# Patient Record
Sex: FEMALE | Race: WHITE | NOT HISPANIC OR LATINO
[De-identification: names, ages, dates, MRNs, and addresses within clinical notes are randomized per-mention and may not be internally consistent; named-entity substitution may affect disease eponyms.]

---

## 2018-06-27 ENCOUNTER — RESULT REVIEW (OUTPATIENT)
Age: 23
End: 2018-06-27

## 2018-12-07 ENCOUNTER — TRANSCRIPTION ENCOUNTER (OUTPATIENT)
Age: 23
End: 2018-12-07

## 2019-06-06 ENCOUNTER — RESULT REVIEW (OUTPATIENT)
Age: 24
End: 2019-06-06

## 2020-04-25 ENCOUNTER — MESSAGE (OUTPATIENT)
Age: 25
End: 2020-04-25

## 2020-05-05 ENCOUNTER — APPOINTMENT (OUTPATIENT)
Dept: DISASTER EMERGENCY | Facility: HOSPITAL | Age: 25
End: 2020-05-05

## 2020-05-05 LAB
SARS-COV-2 IGG SERPL IA-ACNC: <0.1 INDEX
SARS-COV-2 IGG SERPL QL IA: NEGATIVE

## 2020-06-15 ENCOUNTER — RESULT REVIEW (OUTPATIENT)
Age: 25
End: 2020-06-15

## 2020-07-07 ENCOUNTER — TRANSCRIPTION ENCOUNTER (OUTPATIENT)
Age: 25
End: 2020-07-07

## 2020-11-24 ENCOUNTER — TRANSCRIPTION ENCOUNTER (OUTPATIENT)
Age: 25
End: 2020-11-24

## 2020-11-24 ENCOUNTER — APPOINTMENT (OUTPATIENT)
Dept: PRIMARY CARE | Facility: HOSPITAL | Age: 25
End: 2020-11-24

## 2020-11-24 ENCOUNTER — OUTPATIENT (OUTPATIENT)
Dept: OUTPATIENT SERVICES | Facility: HOSPITAL | Age: 25
LOS: 1 days | End: 2020-11-24

## 2020-11-24 VITALS
HEIGHT: 66 IN | OXYGEN SATURATION: 99 % | HEART RATE: 87 BPM | TEMPERATURE: 98.2 F | DIASTOLIC BLOOD PRESSURE: 83 MMHG | SYSTOLIC BLOOD PRESSURE: 135 MMHG | WEIGHT: 165 LBS | BODY MASS INDEX: 26.52 KG/M2

## 2020-11-24 DIAGNOSIS — Z20.828 CONTACT WITH AND (SUSPECTED) EXPOSURE TO OTHER VIRAL COMMUNICABLE DISEASES: ICD-10-CM

## 2020-11-24 LAB — SARS-COV-2 N GENE NPH QL NAA+PROBE: NOT DETECTED

## 2021-01-06 NOTE — HISTORY OF PRESENT ILLNESS
[FreeTextEntry8] : 25 F PCN allergy, no PMH and no PSH [facility].\par \par States that  she wants to get COVID PCR before heading to meet parents for Thanksgiving. Denies any fever, cough, sore throat or SOB. No loss of smell or taste, no chest tightness, or any GI related symptoms of nausea, vomiting or diarrhea. \par \par She works in [employer]. Does not take regular maintenance medications. Denies any chest pain, no chest palpitations or any respiratory distress\par \par \par \par

## 2021-02-03 ENCOUNTER — APPOINTMENT (OUTPATIENT)
Dept: OTOLARYNGOLOGY | Facility: CLINIC | Age: 26
End: 2021-02-03
Payer: COMMERCIAL

## 2021-02-03 DIAGNOSIS — Z83.49 FAMILY HISTORY OF OTHER ENDOCRINE, NUTRITIONAL AND METABOLIC DISEASES: ICD-10-CM

## 2021-02-03 DIAGNOSIS — Z78.9 OTHER SPECIFIED HEALTH STATUS: ICD-10-CM

## 2021-02-03 DIAGNOSIS — Z82.49 FAMILY HISTORY OF ISCHEMIC HEART DISEASE AND OTHER DISEASES OF THE CIRCULATORY SYSTEM: ICD-10-CM

## 2021-02-03 PROCEDURE — 99214 OFFICE O/P EST MOD 30 MIN: CPT | Mod: 25

## 2021-02-03 PROCEDURE — 31231 NASAL ENDOSCOPY DX: CPT

## 2021-02-03 PROCEDURE — 99072 ADDL SUPL MATRL&STAF TM PHE: CPT

## 2021-02-03 RX ORDER — NORETHINDRONE ACETATE AND ETHINYL ESTRADIOL 1; 20 MG/1; UG/1
TABLET ORAL
Refills: 0 | Status: ACTIVE | COMMUNITY

## 2021-02-04 NOTE — HISTORY OF PRESENT ILLNESS
[de-identified] : 25 year old female presents for evaluation of nasal congestion\par Congestion alternates sides however is generally R>L\par Worse in AM\par Reports a hx sinus infections and polyps seen in 2013 at which time was also dx with deviated septum \par Denies anterior rhinorrhea or PND, facial pain or pressure\par Good sense of smell\par Denies recent sinus infections. \par Denies current use of nasal sprays\par \par PMH denies

## 2021-03-18 ENCOUNTER — APPOINTMENT (OUTPATIENT)
Dept: OTOLARYNGOLOGY | Facility: CLINIC | Age: 26
End: 2021-03-18
Payer: COMMERCIAL

## 2021-03-18 VITALS — HEIGHT: 66 IN | BODY MASS INDEX: 24.27 KG/M2 | WEIGHT: 151 LBS

## 2021-03-18 DIAGNOSIS — R09.81 NASAL CONGESTION: ICD-10-CM

## 2021-03-18 DIAGNOSIS — J31.0 CHRONIC RHINITIS: ICD-10-CM

## 2021-03-18 PROCEDURE — 99213 OFFICE O/P EST LOW 20 MIN: CPT | Mod: 25

## 2021-03-18 PROCEDURE — 99072 ADDL SUPL MATRL&STAF TM PHE: CPT

## 2021-03-18 PROCEDURE — 31231 NASAL ENDOSCOPY DX: CPT

## 2021-03-18 NOTE — HISTORY OF PRESENT ILLNESS
[de-identified] : 25 yr old F with hx of rhinitis, DNS presents for follow-up\par LCV 2/03/21 at which time was started on Flonase\par Reports symptoms improved since last visit, mild nasal congestion, Right more than Left, intermittent clear rhinorrhea with bleeding noted when nose blown into tissue\par Denies dyspnea, PND, facial pain and pressure, poor sense of smell, no recent fevers\par No recent sinus infections \par

## 2021-03-18 NOTE — REASON FOR VISIT
[Subsequent Evaluation] : a subsequent evaluation for [Other: _____] : [unfilled] [FreeTextEntry2] : follow up for nasal congestion

## 2021-07-27 ENCOUNTER — RESULT REVIEW (OUTPATIENT)
Age: 26
End: 2021-07-27

## 2021-10-12 ENCOUNTER — TRANSCRIPTION ENCOUNTER (OUTPATIENT)
Age: 26
End: 2021-10-12

## 2021-10-15 ENCOUNTER — TRANSCRIPTION ENCOUNTER (OUTPATIENT)
Age: 26
End: 2021-10-15

## 2021-11-08 ENCOUNTER — OUTPATIENT (OUTPATIENT)
Dept: OUTPATIENT SERVICES | Facility: HOSPITAL | Age: 26
LOS: 1 days | End: 2021-11-08
Payer: COMMERCIAL

## 2021-11-08 ENCOUNTER — APPOINTMENT (OUTPATIENT)
Dept: PRIMARY CARE | Facility: HOSPITAL | Age: 26
End: 2021-11-08

## 2021-11-08 ENCOUNTER — APPOINTMENT (OUTPATIENT)
Dept: RADIOLOGY | Facility: HOSPITAL | Age: 26
End: 2021-11-08

## 2021-11-08 VITALS
WEIGHT: 155 LBS | TEMPERATURE: 98.6 F | DIASTOLIC BLOOD PRESSURE: 90 MMHG | HEIGHT: 66 IN | BODY MASS INDEX: 24.91 KG/M2 | HEART RATE: 80 BPM | OXYGEN SATURATION: 99 % | SYSTOLIC BLOOD PRESSURE: 130 MMHG

## 2021-11-08 DIAGNOSIS — J20.9 ACUTE BRONCHITIS, UNSPECIFIED: ICD-10-CM

## 2021-11-08 DIAGNOSIS — R05.9 COUGH, UNSPECIFIED: ICD-10-CM

## 2021-11-08 LAB
BASOPHILS # BLD AUTO: 0.04 K/UL
BASOPHILS NFR BLD AUTO: 0.6 %
EOSINOPHIL # BLD AUTO: 0.06 K/UL
EOSINOPHIL NFR BLD AUTO: 0.9 %
HCT VFR BLD CALC: 38.8 %
HGB BLD-MCNC: 13.1 G/DL
IMM GRANULOCYTES NFR BLD AUTO: 0.2 %
LYMPHOCYTES # BLD AUTO: 2.26 K/UL
LYMPHOCYTES NFR BLD AUTO: 34.8 %
MAN DIFF?: NORMAL
MCHC RBC-ENTMCNC: 32.2 PG
MCHC RBC-ENTMCNC: 33.8 GM/DL
MCV RBC AUTO: 95.3 FL
MONOCYTES # BLD AUTO: 0.52 K/UL
MONOCYTES NFR BLD AUTO: 8 %
NEUTROPHILS # BLD AUTO: 3.61 K/UL
NEUTROPHILS NFR BLD AUTO: 55.5 %
PLATELET # BLD AUTO: 308 K/UL
RBC # BLD: 4.07 M/UL
RBC # FLD: 12.3 %
WBC # FLD AUTO: 6.5 K/UL

## 2021-11-08 PROCEDURE — 71045 X-RAY EXAM CHEST 1 VIEW: CPT | Mod: 26

## 2021-11-08 NOTE — PHYSICAL EXAM
[No Acute Distress] : no acute distress [Normal Sclera/Conjunctiva] : normal sclera/conjunctiva [Normal Outer Ear/Nose] : the outer ears and nose were normal in appearance [No Respiratory Distress] : no respiratory distress  [No Accessory Muscle Use] : no accessory muscle use [Clear to Auscultation] : lungs were clear to auscultation bilaterally [Normal Rate] : normal rate  [Regular Rhythm] : with a regular rhythm [Soft] : abdomen soft [Non Tender] : non-tender [Non-distended] : non-distended [No Focal Deficits] : no focal deficits [Normal Gait] : normal gait [Normal Affect] : the affect was normal [Normal Insight/Judgement] : insight and judgment were intact [de-identified] : + nasal congestion, + nasal inflammation, no tonsular exduates, no tonsular erythema and no maxillary tenderness [de-identified] : no wheezing, no rhonchi but noted with fine crackles on bilateral lower lobes  [de-identified] : no epigastric tenderness

## 2021-11-08 NOTE — HISTORY OF PRESENT ILLNESS
[FreeTextEntry8] : 26F PCN Allergy,  with no PMH and no PSH who [facility] 7 weeks of cough.\par \par States that she had 7 weeks of intermittent cough, described as clear then mixed with green and yellowish mucous. She went to urgent care twice and wasted Negative COVID PCR. She was prescribed with Antihistamine and inhaler but with no relief. No travel, no sick contacts and no history of PNA but she had few episodes of bronchitis. \par \par She was vaccinated with COVID since January 2021. Denies any fever, chills,  sore throat or SOB. No loss of smell or taste, no chest tightness, or any GI related symptoms of nausea, vomiting or diarrhea. \par \par She works  as   in [employer]. She does not take regular maintenance medications. Denies any chest pain, no chest palpitations or any respiratory distress\par

## 2021-11-09 LAB
ALBUMIN SERPL ELPH-MCNC: 4.5 G/DL
ALP BLD-CCNC: 44 U/L
ALT SERPL-CCNC: 17 U/L
ANION GAP SERPL CALC-SCNC: 15 MMOL/L
AST SERPL-CCNC: 22 U/L
BILIRUB SERPL-MCNC: 0.2 MG/DL
BUN SERPL-MCNC: 12 MG/DL
CALCIUM SERPL-MCNC: 9.2 MG/DL
CHLORIDE SERPL-SCNC: 105 MMOL/L
CO2 SERPL-SCNC: 20 MMOL/L
CREAT SERPL-MCNC: 0.86 MG/DL
GLUCOSE SERPL-MCNC: 81 MG/DL
M TB IFN-G BLD-IMP: NEGATIVE
POTASSIUM SERPL-SCNC: 4.2 MMOL/L
PROT SERPL-MCNC: 7.1 G/DL
QUANTIFERON TB PLUS MITOGEN MINUS NIL: 8.9 IU/ML
QUANTIFERON TB PLUS NIL: 0.03 IU/ML
QUANTIFERON TB PLUS TB1 MINUS NIL: -0.01 IU/ML
QUANTIFERON TB PLUS TB2 MINUS NIL: -0.02 IU/ML
SODIUM SERPL-SCNC: 140 MMOL/L

## 2021-12-03 ENCOUNTER — TRANSCRIPTION ENCOUNTER (OUTPATIENT)
Age: 26
End: 2021-12-03

## 2021-12-21 ENCOUNTER — TRANSCRIPTION ENCOUNTER (OUTPATIENT)
Age: 26
End: 2021-12-21

## 2022-10-13 ENCOUNTER — RESULT REVIEW (OUTPATIENT)
Age: 27
End: 2022-10-13

## 2022-10-14 ENCOUNTER — APPOINTMENT (OUTPATIENT)
Dept: FAMILY MEDICINE | Facility: CLINIC | Age: 27
End: 2022-10-14

## 2022-10-14 ENCOUNTER — NON-APPOINTMENT (OUTPATIENT)
Age: 27
End: 2022-10-14

## 2022-10-14 VITALS
SYSTOLIC BLOOD PRESSURE: 120 MMHG | DIASTOLIC BLOOD PRESSURE: 76 MMHG | HEIGHT: 66 IN | TEMPERATURE: 98.4 F | WEIGHT: 163 LBS | BODY MASS INDEX: 26.2 KG/M2 | OXYGEN SATURATION: 98 % | HEART RATE: 80 BPM

## 2022-10-14 DIAGNOSIS — M79.673 PAIN IN UNSPECIFIED FOOT: ICD-10-CM

## 2022-10-14 DIAGNOSIS — Z83.6 FAMILY HISTORY OF OTHER DISEASES OF THE RESPIRATORY SYSTEM: ICD-10-CM

## 2022-10-14 DIAGNOSIS — Z80.3 FAMILY HISTORY OF MALIGNANT NEOPLASM OF BREAST: ICD-10-CM

## 2022-10-14 PROCEDURE — 99214 OFFICE O/P EST MOD 30 MIN: CPT | Mod: 25

## 2022-10-14 PROCEDURE — 36415 COLL VENOUS BLD VENIPUNCTURE: CPT

## 2022-10-14 PROCEDURE — 99385 PREV VISIT NEW AGE 18-39: CPT | Mod: 25

## 2022-10-14 RX ORDER — FLUTICASONE PROPIONATE 50 UG/1
50 SPRAY, METERED NASAL
Qty: 1 | Refills: 3 | Status: DISCONTINUED | COMMUNITY
Start: 2021-02-03 | End: 2022-10-14

## 2022-10-14 RX ORDER — PREDNISONE 20 MG/1
20 TABLET ORAL
Qty: 3 | Refills: 0 | Status: DISCONTINUED | COMMUNITY
Start: 2021-11-08 | End: 2022-10-14

## 2022-10-14 RX ORDER — DOXYCYCLINE HYCLATE 100 MG/1
100 TABLET ORAL TWICE DAILY
Qty: 14 | Refills: 0 | Status: DISCONTINUED | COMMUNITY
Start: 2021-11-08 | End: 2022-10-14

## 2022-10-14 NOTE — HISTORY OF PRESENT ILLNESS
[FreeTextEntry1] : Ms. SANDHU presents for annual physical and few other issues.\par  [de-identified] : Ms. SANDHU presents for annual physical.\par \lopez Saw Gyn yesterday-had pap smear. \par LMP 3 weeks ago. \par \par Notes hands tremor- at times-has been happening for 2 years-Happened more recently.  Even noticed [place]  \par \lopez Has a high instep, her feet hurt if she has been walking, tries to wear good, supportive new balance sneakers and feet still hurt. \par \lopez Has been having difficulty sleeping, physically gets up but is still dreaming, feels like she is seeing things in dreams and reacts to them.  Jumped out of bed and hit foot on the desk chair.  Patient reports she did sleepwalk as a child, her mom had to put gate up.  \par \lopez Also experiences constipation.

## 2022-10-14 NOTE — HEALTH RISK ASSESSMENT
[Never] : Never [Yes] : Yes [2 - 4 times a month (2 pts)] : 2-4 times a month (2 points) [HIV test declined] : HIV test declined [# of Members in Household ___] :  household currently consist of [unfilled] member(s) [Employed] : employed [Significant Other] : lives with significant other [de-identified] : 3x a week strength training [de-identified] : Varied diet  [PapSmearDate] : 10/22 [de-identified] : Aleena [FreeTextEntry2] : [employer]  [de-identified] : Eyeglasses distance-eye exam-3 years ago [de-identified] : 6 months ago

## 2022-10-14 NOTE — REVIEW OF SYSTEMS
[Constipation] : constipation [Anxiety] : anxiety [Negative] : Genitourinary [Vomiting] : no vomiting [Heartburn] : no heartburn [Headache] : no headache [Dizziness] : no dizziness [FreeTextEntry7] : constipation-had workup which was negative [de-identified] : 6-8 hours of sleep per night; jumps out of bed; started therapist weekly

## 2022-10-14 NOTE — PLAN
[FreeTextEntry1] : Ate Banana and Coffee. \par Will follow up labwork drawn in office today.\par \par HM-up to date gyn. \par \par Intterupted Sleep-recommending Sleep Med evaluation. \par \par Tremor-check b12, neuro evaluation.\par \par Foot Pain-Podiatry eval. \par Anxiety-working with therapist. \par \par Constipation-bowel regimen discussed; patient will try to hydrate more regularly. \par Will adjust meds based on labs. \par Patient expressed understanding of plan.\par \par

## 2022-10-14 NOTE — PHYSICAL EXAM
[Normal Oropharynx] : the oropharynx was normal [No Edema] : there was no peripheral edema [No Extremity Clubbing/Cyanosis] : no extremity clubbing/cyanosis [Grossly Normal Strength/Tone] : grossly normal strength/tone [Normal] : affect was normal and insight and judgment were intact [de-identified] :  strength intact; negative tinel's sign bilaterally

## 2022-10-17 LAB
ALBUMIN SERPL ELPH-MCNC: 4.5 G/DL
ALP BLD-CCNC: 49 U/L
ALT SERPL-CCNC: 13 U/L
ANION GAP SERPL CALC-SCNC: 11 MMOL/L
AST SERPL-CCNC: 17 U/L
BASOPHILS # BLD AUTO: 0.04 K/UL
BASOPHILS NFR BLD AUTO: 0.6 %
BILIRUB SERPL-MCNC: 0.3 MG/DL
BUN SERPL-MCNC: 8 MG/DL
CALCIUM SERPL-MCNC: 9.6 MG/DL
CHLORIDE SERPL-SCNC: 103 MMOL/L
CHOLEST SERPL-MCNC: 172 MG/DL
CO2 SERPL-SCNC: 24 MMOL/L
CREAT SERPL-MCNC: 0.81 MG/DL
EGFR: 101 ML/MIN/1.73M2
EOSINOPHIL # BLD AUTO: 0.03 K/UL
EOSINOPHIL NFR BLD AUTO: 0.4 %
ESTIMATED AVERAGE GLUCOSE: 103 MG/DL
FOLATE SERPL-MCNC: 12.3 NG/ML
GLUCOSE SERPL-MCNC: 83 MG/DL
HBA1C MFR BLD HPLC: 5.2 %
HCT VFR BLD CALC: 41.4 %
HDLC SERPL-MCNC: 71 MG/DL
HGB BLD-MCNC: 13.5 G/DL
IMM GRANULOCYTES NFR BLD AUTO: 0.3 %
LDLC SERPL CALC-MCNC: 83 MG/DL
LYMPHOCYTES # BLD AUTO: 2.07 K/UL
LYMPHOCYTES NFR BLD AUTO: 29.7 %
MAN DIFF?: NORMAL
MCHC RBC-ENTMCNC: 31 PG
MCHC RBC-ENTMCNC: 32.6 GM/DL
MCV RBC AUTO: 95.2 FL
MONOCYTES # BLD AUTO: 0.51 K/UL
MONOCYTES NFR BLD AUTO: 7.3 %
NEUTROPHILS # BLD AUTO: 4.29 K/UL
NEUTROPHILS NFR BLD AUTO: 61.7 %
NONHDLC SERPL-MCNC: 101 MG/DL
PLATELET # BLD AUTO: 303 K/UL
POTASSIUM SERPL-SCNC: 4.2 MMOL/L
PROT SERPL-MCNC: 6.7 G/DL
RBC # BLD: 4.35 M/UL
RBC # FLD: 12.9 %
SODIUM SERPL-SCNC: 138 MMOL/L
TRIGL SERPL-MCNC: 88 MG/DL
TSH SERPL-ACNC: 3.49 UIU/ML
VIT B12 SERPL-MCNC: 200 PG/ML
WBC # FLD AUTO: 6.96 K/UL

## 2023-01-03 ENCOUNTER — NON-APPOINTMENT (OUTPATIENT)
Age: 28
End: 2023-01-03

## 2023-01-06 ENCOUNTER — APPOINTMENT (OUTPATIENT)
Dept: FAMILY MEDICINE | Facility: CLINIC | Age: 28
End: 2023-01-06
Payer: COMMERCIAL

## 2023-01-06 VITALS
HEART RATE: 72 BPM | DIASTOLIC BLOOD PRESSURE: 80 MMHG | BODY MASS INDEX: 26.52 KG/M2 | OXYGEN SATURATION: 98 % | SYSTOLIC BLOOD PRESSURE: 122 MMHG | HEIGHT: 66 IN | TEMPERATURE: 98.4 F | WEIGHT: 165 LBS

## 2023-01-06 DIAGNOSIS — K59.00 CONSTIPATION, UNSPECIFIED: ICD-10-CM

## 2023-01-06 PROCEDURE — 96372 THER/PROPH/DIAG INJ SC/IM: CPT

## 2023-01-06 PROCEDURE — 36415 COLL VENOUS BLD VENIPUNCTURE: CPT

## 2023-01-06 PROCEDURE — 99214 OFFICE O/P EST MOD 30 MIN: CPT | Mod: 25

## 2023-01-06 RX ORDER — CYANOCOBALAMIN 1000 UG/ML
1000 INJECTION INTRAMUSCULAR; SUBCUTANEOUS
Qty: 0 | Refills: 0 | Status: COMPLETED | OUTPATIENT
Start: 2023-01-06

## 2023-01-06 RX ADMIN — CYANOCOBALAMIN 0 MCG/ML: 1000 INJECTION INTRAMUSCULAR; SUBCUTANEOUS at 00:00

## 2023-01-06 NOTE — HISTORY OF PRESENT ILLNESS
[FreeTextEntry1] : Here for low B12.  [de-identified] : Here for B12 follow-up.\par Still getting tremoring in hands.  \par Did not notice last month.  Was more prevalent in November.  \par Has upcoming Neurology appt.\par \par -Dreams are better-still waking up tossing and turning. \par 4-5 hours of sleep per night. Did not schedule sleep medicine yet. \par \par Anxiety-Meeting with therapist every 1-2 weeks. \par \par Eats varied diet.  Not vegetarian.

## 2023-01-06 NOTE — PLAN
[FreeTextEntry1] : \par Low S74-jmrhgyns injection.\par Advised Ms. JOAO SANDHU they may experience some soreness, tenderness at the injection site.  Advised to call office if  not improving.  Ms. SANDHU expressed understanding.\par \par Recheck labs.\par \par Constipation-has increased fluid; trying to increase vegetables.  Advised if ongoing bowel issues will require GI eval-patient had seen several years ago. \par \par Has upcoming Neurology appointment. \par Will adjust meds based on labs. \par Patient expressed understanding of plan.\par

## 2023-01-06 NOTE — REVIEW OF SYSTEMS
[Fatigue] : fatigue [Anxiety] : anxiety [Negative] : Respiratory [Fever] : no fever [Chills] : no chills [Nausea] : no nausea [Diarrhea] : diarrhea [Vomiting] : no vomiting [Suicidal] : not suicidal [Depression] : no depression [FreeTextEntry7] : still constipation at times; trying to switch, diet trying to get more veggies in

## 2023-01-07 LAB
ANION GAP SERPL CALC-SCNC: 13 MMOL/L
BASOPHILS # BLD AUTO: 0.04 K/UL
BASOPHILS NFR BLD AUTO: 0.7 %
BUN SERPL-MCNC: 11 MG/DL
CALCIUM SERPL-MCNC: 9.7 MG/DL
CHLORIDE SERPL-SCNC: 102 MMOL/L
CO2 SERPL-SCNC: 22 MMOL/L
CREAT SERPL-MCNC: 0.9 MG/DL
EGFR: 89 ML/MIN/1.73M2
EOSINOPHIL # BLD AUTO: 0.05 K/UL
EOSINOPHIL NFR BLD AUTO: 0.8 %
FOLATE SERPL-MCNC: >20 NG/ML
GLUCOSE SERPL-MCNC: 83 MG/DL
HCT VFR BLD CALC: 40.2 %
HGB BLD-MCNC: 13.6 G/DL
IMM GRANULOCYTES NFR BLD AUTO: 0.3 %
LYMPHOCYTES # BLD AUTO: 2.38 K/UL
LYMPHOCYTES NFR BLD AUTO: 38.9 %
MAN DIFF?: NORMAL
MCHC RBC-ENTMCNC: 31.6 PG
MCHC RBC-ENTMCNC: 33.8 GM/DL
MCV RBC AUTO: 93.3 FL
MONOCYTES # BLD AUTO: 0.56 K/UL
MONOCYTES NFR BLD AUTO: 9.2 %
NEUTROPHILS # BLD AUTO: 3.07 K/UL
NEUTROPHILS NFR BLD AUTO: 50.1 %
PLATELET # BLD AUTO: 314 K/UL
POTASSIUM SERPL-SCNC: 4.5 MMOL/L
RBC # BLD: 4.31 M/UL
RBC # FLD: 12 %
SODIUM SERPL-SCNC: 138 MMOL/L
VIT B12 SERPL-MCNC: 381 PG/ML
WBC # FLD AUTO: 6.12 K/UL

## 2023-01-15 ENCOUNTER — NON-APPOINTMENT (OUTPATIENT)
Age: 28
End: 2023-01-15

## 2023-02-03 ENCOUNTER — APPOINTMENT (OUTPATIENT)
Dept: NEUROLOGY | Facility: CLINIC | Age: 28
End: 2023-02-03
Payer: COMMERCIAL

## 2023-02-03 VITALS
HEIGHT: 68 IN | SYSTOLIC BLOOD PRESSURE: 124 MMHG | DIASTOLIC BLOOD PRESSURE: 80 MMHG | WEIGHT: 165 LBS | HEART RATE: 74 BPM | BODY MASS INDEX: 25.01 KG/M2

## 2023-02-03 DIAGNOSIS — R25.1 TREMOR, UNSPECIFIED: ICD-10-CM

## 2023-02-03 PROCEDURE — 99203 OFFICE O/P NEW LOW 30 MIN: CPT

## 2023-02-03 NOTE — HISTORY OF PRESENT ILLNESS
[FreeTextEntry1] : Zee Heard is a 27 year old F with no significant PMHx. She presents for initial evaluation in the Movement Disorders Clinic at [facility] for tremors.\par \par On and off in the last few years she has had intermittent hand tremors. Since November 2022 she has not had any tremors. \par More consistent since September, and started going up her arm. She notes that sometimes she would notice her thumb moving when she is holding a utensil.\par When it happens, if she tries to  something it is hard to get a good hold. She thought it was dehydration or didn't eat, but there are times it happens when that is not the case and the tremors still occur. She told her PCP and she suggested to see a Neurologist. \par \par No changes in handwriting. \par She drinks caffeine, she has not noticed the tremors get worse because not consistent. Lasts about an hour when it occurs.\par She did not get tremors every day, 1-2 times a week. \par \par No changes in vision. Only in the last few weeks she has a subtle headache. The last few times, she the tingling went up her arms. No weakness otherwise. No dizziness/lightheadedness. No LOC. No nausea/vomiting. She has constipation. No urine issues.\par \par Family history: unremarkable\par Social history: she exercises, working - tremors are not interfering with work

## 2023-02-03 NOTE — PHYSICAL EXAM
[Person] : oriented to person [Place] : oriented to place [Time] : oriented to time [Concentration Intact] : normal concentrating ability [Comprehension] : comprehension intact [Cranial Nerves Optic (II)] : visual acuity intact bilaterally,  visual fields full to confrontation, pupils equal round and reactive to light [Cranial Nerves Trigeminal (V)] : facial sensation intact symmetrically [Cranial Nerves Oculomotor (III)] : extraocular motion intact [Cranial Nerves Facial (VII)] : face symmetrical [Cranial Nerves Vestibulocochlear (VIII)] : hearing was intact bilaterally [Cranial Nerves Glossopharyngeal (IX)] : tongue and palate midline [Cranial Nerves Accessory (XI - Cranial And Spinal)] : head turning and shoulder shrug symmetric [Cranial Nerves Hypoglossal (XII)] : there was no tongue deviation with protrusion [Motor Strength] : muscle strength was normal in all four extremities [Involuntary Movements] : no involuntary movements were seen [Paresis Pronator Drift Right-Sided] : no pronator drift on the right [Paresis Pronator Drift Left-Sided] : no pronator drift on the left [Sensation Tactile Decrease] : light touch was intact [Romberg's Sign] : Romberg's sign was negtive [Abnormal Walk] : normal gait [Balance] : balance was intact [Tremor] : no tremor present [Coordination - Dysmetria Impaired Finger-to-Nose Bilateral] : not present [2+] : Ankle jerk left 2+

## 2023-02-03 NOTE — DISCUSSION/SUMMARY
[FreeTextEntry1] : Zee Heard is a 27 year old F with no significant PMHx. She presents for initial evaluation in the Movement Disorders Clinic at [facility] for tremors.\par \par No involuntary movements were noted during today's examination. Her neurological examination at present is normal. Unclear what her movements that she noticed were related to. We discussed the most common types of tremors including physiologic and essential tremors. We will continue to monitor and if she has any changes, she will follow up with me in the future.\par \par RTC PRN\par \par All questions were answered to her satisfaction. I spent 30 minutes on this encounter.

## 2023-06-14 ENCOUNTER — NON-APPOINTMENT (OUTPATIENT)
Age: 28
End: 2023-06-14

## 2023-06-14 ENCOUNTER — LABORATORY RESULT (OUTPATIENT)
Age: 28
End: 2023-06-14

## 2023-06-14 ENCOUNTER — APPOINTMENT (OUTPATIENT)
Dept: INTERNAL MEDICINE | Facility: CLINIC | Age: 28
End: 2023-06-14
Payer: COMMERCIAL

## 2023-06-14 VITALS
BODY MASS INDEX: 25.55 KG/M2 | SYSTOLIC BLOOD PRESSURE: 123 MMHG | HEIGHT: 66 IN | WEIGHT: 159 LBS | OXYGEN SATURATION: 97 % | HEART RATE: 68 BPM | DIASTOLIC BLOOD PRESSURE: 90 MMHG

## 2023-06-14 DIAGNOSIS — K58.1 IRRITABLE BOWEL SYNDROME WITH CONSTIPATION: ICD-10-CM

## 2023-06-14 DIAGNOSIS — Z80.0 FAMILY HISTORY OF MALIGNANT NEOPLASM OF DIGESTIVE ORGANS: ICD-10-CM

## 2023-06-14 PROCEDURE — 99203 OFFICE O/P NEW LOW 30 MIN: CPT | Mod: 25

## 2023-06-14 PROCEDURE — 36415 COLL VENOUS BLD VENIPUNCTURE: CPT

## 2023-06-14 NOTE — HISTORY OF PRESENT ILLNESS
[FreeTextEntry1] : GI issues for years. Saw GI doc years ago. C/o constipation- 2-3 weeks ago had diarrhea 5-5 days- very bloated- had mucous in her stools. After 6 days took 2 imodium and it resolved . \par Usually has BM's once a week. had Barium Enema 6-7 years ago. \par She also has been diagnosed with B12 defficiency had B12 shot and now using B12 supplement. \par  Once every other month she gets diarrhea . \par She tried probiotics without relief. \par  FATHER HAD COLON CANCER AT A YOUNG AGE

## 2023-06-14 NOTE — PHYSICAL EXAM

## 2023-06-15 ENCOUNTER — NON-APPOINTMENT (OUTPATIENT)
Age: 28
End: 2023-06-15

## 2023-06-18 ENCOUNTER — NON-APPOINTMENT (OUTPATIENT)
Age: 28
End: 2023-06-18

## 2023-06-20 ENCOUNTER — APPOINTMENT (OUTPATIENT)
Dept: INTERNAL MEDICINE | Facility: CLINIC | Age: 28
End: 2023-06-20
Payer: COMMERCIAL

## 2023-06-20 VITALS
BODY MASS INDEX: 25.39 KG/M2 | WEIGHT: 158 LBS | OXYGEN SATURATION: 99 % | SYSTOLIC BLOOD PRESSURE: 140 MMHG | HEIGHT: 66 IN | TEMPERATURE: 98.5 F | DIASTOLIC BLOOD PRESSURE: 88 MMHG | HEART RATE: 88 BPM | RESPIRATION RATE: 18 BRPM

## 2023-06-20 PROCEDURE — 99214 OFFICE O/P EST MOD 30 MIN: CPT

## 2023-06-20 NOTE — REVIEW OF SYSTEMS
[Fever] : fever [Earache] : earache [Hoarseness] : hoarseness [Sore Throat] : sore throat [Negative] : Heme/Lymph

## 2023-06-20 NOTE — HISTORY OF PRESENT ILLNESS
[FreeTextEntry8] : 28 year old female here for cough, sore throat and low grade fever since Thursday. Patient went to urgent care twice strep negative, covid negative. Patient states throat has been getting worse now ear pain. Patient states cough is not productive. no abdominal pain no chest pain no sob.

## 2023-06-20 NOTE — PHYSICAL EXAM
[No Acute Distress] : no acute distress [Well Nourished] : well nourished [Well Developed] : well developed [Well-Appearing] : well-appearing [Normal Sclera/Conjunctiva] : normal sclera/conjunctiva [PERRL] : pupils equal round and reactive to light [EOMI] : extraocular movements intact [Normal Outer Ear/Nose] : the outer ears and nose were normal in appearance [No JVD] : no jugular venous distention [No Lymphadenopathy] : no lymphadenopathy [Supple] : supple [Thyroid Normal, No Nodules] : the thyroid was normal and there were no nodules present [No Respiratory Distress] : no respiratory distress  [No Accessory Muscle Use] : no accessory muscle use [Clear to Auscultation] : lungs were clear to auscultation bilaterally [Normal Rate] : normal rate  [Regular Rhythm] : with a regular rhythm [Normal S1, S2] : normal S1 and S2 [No Murmur] : no murmur heard [No Carotid Bruits] : no carotid bruits [No Abdominal Bruit] : a ~M bruit was not heard ~T in the abdomen [No Varicosities] : no varicosities [Pedal Pulses Present] : the pedal pulses are present [No Edema] : there was no peripheral edema [No Palpable Aorta] : no palpable aorta [No Extremity Clubbing/Cyanosis] : no extremity clubbing/cyanosis [Soft] : abdomen soft [Non Tender] : non-tender [Non-distended] : non-distended [No Masses] : no abdominal mass palpated [No HSM] : no HSM [Normal Bowel Sounds] : normal bowel sounds [Normal Posterior Cervical Nodes] : no posterior cervical lymphadenopathy [Normal Anterior Cervical Nodes] : no anterior cervical lymphadenopathy [No CVA Tenderness] : no CVA  tenderness [No Spinal Tenderness] : no spinal tenderness [No Joint Swelling] : no joint swelling [Grossly Normal Strength/Tone] : grossly normal strength/tone [No Rash] : no rash [Coordination Grossly Intact] : coordination grossly intact [No Focal Deficits] : no focal deficits [Normal Gait] : normal gait [Deep Tendon Reflexes (DTR)] : deep tendon reflexes were 2+ and symmetric [Normal Affect] : the affect was normal [Normal Insight/Judgement] : insight and judgment were intact [de-identified] : red throat

## 2023-06-21 ENCOUNTER — NON-APPOINTMENT (OUTPATIENT)
Age: 28
End: 2023-06-21

## 2023-06-21 DIAGNOSIS — R19.4 CHANGE IN BOWEL HABIT: ICD-10-CM

## 2023-06-21 LAB
BAKER'S YEAST AB QL: 18.1 UNITS
BAKER'S YEAST IGA QL IA: <5 UNITS
BAKER'S YEAST IGA QN IA: NEGATIVE
BAKER'S YEAST IGG QN IA: NEGATIVE
CRP SERPL-MCNC: 8 MG/L
ERYTHROCYTE [SEDIMENTATION RATE] IN BLOOD BY WESTERGREN METHOD: 7 MM/HR
FERRITIN SERPL-MCNC: 51 NG/ML
FOLATE SERPL-MCNC: 10.5 NG/ML
IGA SER QL IEP: 131 MG/DL
IRON SATN MFR SERPL: 47 %
IRON SERPL-MCNC: 154 UG/DL
TIBC SERPL-MCNC: 325 UG/DL
TTG IGA SER IA-ACNC: <1.2 U/ML
TTG IGA SER-ACNC: NEGATIVE
TTG IGG SER IA-ACNC: 4.8 U/ML
TTG IGG SER IA-ACNC: NEGATIVE
UIBC SERPL-MCNC: 171 UG/DL
VIT B12 SERPL-MCNC: 413 PG/ML

## 2023-06-30 ENCOUNTER — APPOINTMENT (OUTPATIENT)
Dept: DERMATOLOGY | Facility: CLINIC | Age: 28
End: 2023-06-30
Payer: COMMERCIAL

## 2023-06-30 DIAGNOSIS — L81.4 OTHER MELANIN HYPERPIGMENTATION: ICD-10-CM

## 2023-06-30 DIAGNOSIS — L73.9 FOLLICULAR DISORDER, UNSPECIFIED: ICD-10-CM

## 2023-06-30 DIAGNOSIS — L81.2 FRECKLES: ICD-10-CM

## 2023-06-30 DIAGNOSIS — D22.9 MELANOCYTIC NEVI, UNSPECIFIED: ICD-10-CM

## 2023-06-30 DIAGNOSIS — Z12.83 ENCOUNTER FOR SCREENING FOR MALIGNANT NEOPLASM OF SKIN: ICD-10-CM

## 2023-06-30 DIAGNOSIS — L91.8 OTHER HYPERTROPHIC DISORDERS OF THE SKIN: ICD-10-CM

## 2023-06-30 PROCEDURE — 99204 OFFICE O/P NEW MOD 45 MIN: CPT | Mod: 25

## 2023-06-30 PROCEDURE — 17110 DESTRUCTION B9 LES UP TO 14: CPT

## 2023-07-07 ENCOUNTER — APPOINTMENT (OUTPATIENT)
Dept: INTERNAL MEDICINE | Facility: AMBULATORY MEDICAL SERVICES | Age: 28
End: 2023-07-07
Payer: COMMERCIAL

## 2023-07-07 PROCEDURE — 45380 COLONOSCOPY AND BIOPSY: CPT

## 2023-10-01 PROBLEM — L81.2 EPHELIDES: Status: ACTIVE | Noted: 2023-06-30

## 2023-10-27 ENCOUNTER — APPOINTMENT (OUTPATIENT)
Dept: INTERNAL MEDICINE | Facility: CLINIC | Age: 28
End: 2023-10-27
Payer: COMMERCIAL

## 2023-10-27 VITALS
DIASTOLIC BLOOD PRESSURE: 82 MMHG | TEMPERATURE: 98.9 F | OXYGEN SATURATION: 97 % | HEIGHT: 66 IN | HEART RATE: 105 BPM | WEIGHT: 161 LBS | SYSTOLIC BLOOD PRESSURE: 120 MMHG | BODY MASS INDEX: 25.88 KG/M2

## 2023-10-27 DIAGNOSIS — J02.9 ACUTE PHARYNGITIS, UNSPECIFIED: ICD-10-CM

## 2023-10-27 PROCEDURE — 99213 OFFICE O/P EST LOW 20 MIN: CPT

## 2023-11-30 ENCOUNTER — APPOINTMENT (OUTPATIENT)
Dept: OTOLARYNGOLOGY | Facility: CLINIC | Age: 28
End: 2023-11-30
Payer: COMMERCIAL

## 2023-11-30 VITALS
WEIGHT: 160 LBS | SYSTOLIC BLOOD PRESSURE: 122 MMHG | BODY MASS INDEX: 25.71 KG/M2 | HEART RATE: 87 BPM | DIASTOLIC BLOOD PRESSURE: 84 MMHG | HEIGHT: 66 IN

## 2023-11-30 PROCEDURE — 31231 NASAL ENDOSCOPY DX: CPT

## 2023-11-30 PROCEDURE — 99214 OFFICE O/P EST MOD 30 MIN: CPT | Mod: 25

## 2023-11-30 RX ORDER — AZITHROMYCIN 250 MG/1
250 TABLET, FILM COATED ORAL
Qty: 1 | Refills: 0 | Status: COMPLETED | COMMUNITY
Start: 2021-11-08 | End: 2023-11-30

## 2023-12-14 ENCOUNTER — APPOINTMENT (OUTPATIENT)
Dept: OTOLARYNGOLOGY | Facility: CLINIC | Age: 28
End: 2023-12-14
Payer: COMMERCIAL

## 2023-12-14 VITALS
DIASTOLIC BLOOD PRESSURE: 73 MMHG | HEART RATE: 79 BPM | BODY MASS INDEX: 25.71 KG/M2 | WEIGHT: 160 LBS | SYSTOLIC BLOOD PRESSURE: 126 MMHG | HEIGHT: 66 IN

## 2023-12-14 DIAGNOSIS — J38.7 OTHER DISEASES OF LARYNX: ICD-10-CM

## 2023-12-14 PROCEDURE — 99213 OFFICE O/P EST LOW 20 MIN: CPT | Mod: 25

## 2023-12-14 PROCEDURE — 31575 DIAGNOSTIC LARYNGOSCOPY: CPT

## 2023-12-14 RX ORDER — CLINDAMYCIN HYDROCHLORIDE 300 MG/1
300 CAPSULE ORAL
Qty: 21 | Refills: 0 | Status: COMPLETED | COMMUNITY
Start: 2023-11-30 | End: 2023-12-14

## 2023-12-14 RX ORDER — METHYLPREDNISOLONE 4 MG/1
4 TABLET ORAL
Qty: 1 | Refills: 0 | Status: COMPLETED | COMMUNITY
Start: 2023-11-30 | End: 2023-12-14

## 2023-12-14 NOTE — PHYSICAL EXAM
[Normal] : teeth are normal [Laryngoscopy Performed] : laryngoscopy was performed, see procedure section for findings

## 2023-12-14 NOTE — ASSESSMENT
[FreeTextEntry1] : 28F with mild fullness of the L lingual surface of the epiglottis, discomfort, globus resolved with 1 week clinda.

## 2023-12-14 NOTE — REASON FOR VISIT
[Initial Evaluation] : an initial evaluation for [FreeTextEntry2] : referred by Dr. Sam for globus sensation.

## 2023-12-14 NOTE — PROCEDURE
[de-identified] : NPL: Nasopharynx clear without masses Base of tongue without masses or lesions Vallecula clear Pyriforms clear Epiglottis with mild fullness on the L lingual surface TVFs mobile bilaterally  Glottic airway patent

## 2023-12-14 NOTE — HISTORY OF PRESENT ILLNESS
[de-identified] : 28 year old female referred by Dr. Sam for globus sensation.  States had left epiglottis swelling and throat pain 11/30/23 - treated with 7 day course Clindamycin with improvement. Did not take MDP as prescribed.  States globus sensation improved. Denies pain, voice changes, SOB. Denies dysphagia, odynophagia, aspirations, dyspnea, dysphonia.

## 2023-12-25 ENCOUNTER — EMERGENCY (EMERGENCY)
Facility: HOSPITAL | Age: 28
LOS: 1 days | Discharge: HOME | End: 2023-12-25
Attending: STUDENT IN AN ORGANIZED HEALTH CARE EDUCATION/TRAINING PROGRAM | Admitting: STUDENT IN AN ORGANIZED HEALTH CARE EDUCATION/TRAINING PROGRAM
Payer: COMMERCIAL

## 2023-12-25 VITALS
HEART RATE: 130 BPM | OXYGEN SATURATION: 99 % | RESPIRATION RATE: 18 BRPM | DIASTOLIC BLOOD PRESSURE: 78 MMHG | TEMPERATURE: 100 F | SYSTOLIC BLOOD PRESSURE: 128 MMHG

## 2023-12-25 VITALS
SYSTOLIC BLOOD PRESSURE: 114 MMHG | HEART RATE: 113 BPM | RESPIRATION RATE: 18 BRPM | DIASTOLIC BLOOD PRESSURE: 78 MMHG | OXYGEN SATURATION: 99 % | TEMPERATURE: 99 F

## 2023-12-25 LAB
ALBUMIN SERPL ELPH-MCNC: 4.4 G/DL — SIGNIFICANT CHANGE UP (ref 3.3–5)
ALBUMIN SERPL ELPH-MCNC: 4.4 G/DL — SIGNIFICANT CHANGE UP (ref 3.3–5)
ALP SERPL-CCNC: 55 U/L — SIGNIFICANT CHANGE UP (ref 40–120)
ALP SERPL-CCNC: 55 U/L — SIGNIFICANT CHANGE UP (ref 40–120)
ALT FLD-CCNC: 11 U/L — SIGNIFICANT CHANGE UP (ref 4–33)
ALT FLD-CCNC: 11 U/L — SIGNIFICANT CHANGE UP (ref 4–33)
ANION GAP SERPL CALC-SCNC: 10 MMOL/L — SIGNIFICANT CHANGE UP (ref 7–14)
ANION GAP SERPL CALC-SCNC: 10 MMOL/L — SIGNIFICANT CHANGE UP (ref 7–14)
APPEARANCE UR: ABNORMAL
APPEARANCE UR: ABNORMAL
APTT BLD: 29.2 SEC — SIGNIFICANT CHANGE UP (ref 24.5–35.6)
APTT BLD: 29.2 SEC — SIGNIFICANT CHANGE UP (ref 24.5–35.6)
AST SERPL-CCNC: 15 U/L — SIGNIFICANT CHANGE UP (ref 4–32)
AST SERPL-CCNC: 15 U/L — SIGNIFICANT CHANGE UP (ref 4–32)
BASOPHILS # BLD AUTO: 0.02 K/UL — SIGNIFICANT CHANGE UP (ref 0–0.2)
BASOPHILS # BLD AUTO: 0.02 K/UL — SIGNIFICANT CHANGE UP (ref 0–0.2)
BASOPHILS NFR BLD AUTO: 0.3 % — SIGNIFICANT CHANGE UP (ref 0–2)
BASOPHILS NFR BLD AUTO: 0.3 % — SIGNIFICANT CHANGE UP (ref 0–2)
BILIRUB SERPL-MCNC: 0.2 MG/DL — SIGNIFICANT CHANGE UP (ref 0.2–1.2)
BILIRUB SERPL-MCNC: 0.2 MG/DL — SIGNIFICANT CHANGE UP (ref 0.2–1.2)
BILIRUB UR-MCNC: NEGATIVE — SIGNIFICANT CHANGE UP
BILIRUB UR-MCNC: NEGATIVE — SIGNIFICANT CHANGE UP
BLOOD GAS VENOUS COMPREHENSIVE RESULT: SIGNIFICANT CHANGE UP
BLOOD GAS VENOUS COMPREHENSIVE RESULT: SIGNIFICANT CHANGE UP
BUN SERPL-MCNC: 11 MG/DL — SIGNIFICANT CHANGE UP (ref 7–23)
BUN SERPL-MCNC: 11 MG/DL — SIGNIFICANT CHANGE UP (ref 7–23)
CALCIUM SERPL-MCNC: 8.9 MG/DL — SIGNIFICANT CHANGE UP (ref 8.4–10.5)
CALCIUM SERPL-MCNC: 8.9 MG/DL — SIGNIFICANT CHANGE UP (ref 8.4–10.5)
CHLORIDE SERPL-SCNC: 102 MMOL/L — SIGNIFICANT CHANGE UP (ref 98–107)
CHLORIDE SERPL-SCNC: 102 MMOL/L — SIGNIFICANT CHANGE UP (ref 98–107)
CO2 SERPL-SCNC: 21 MMOL/L — LOW (ref 22–31)
CO2 SERPL-SCNC: 21 MMOL/L — LOW (ref 22–31)
COLOR SPEC: YELLOW — SIGNIFICANT CHANGE UP
COLOR SPEC: YELLOW — SIGNIFICANT CHANGE UP
CREAT SERPL-MCNC: 0.82 MG/DL — SIGNIFICANT CHANGE UP (ref 0.5–1.3)
CREAT SERPL-MCNC: 0.82 MG/DL — SIGNIFICANT CHANGE UP (ref 0.5–1.3)
DIFF PNL FLD: ABNORMAL
DIFF PNL FLD: ABNORMAL
EGFR: 99 ML/MIN/1.73M2 — SIGNIFICANT CHANGE UP
EGFR: 99 ML/MIN/1.73M2 — SIGNIFICANT CHANGE UP
EOSINOPHIL # BLD AUTO: 0 K/UL — SIGNIFICANT CHANGE UP (ref 0–0.5)
EOSINOPHIL # BLD AUTO: 0 K/UL — SIGNIFICANT CHANGE UP (ref 0–0.5)
EOSINOPHIL NFR BLD AUTO: 0 % — SIGNIFICANT CHANGE UP (ref 0–6)
EOSINOPHIL NFR BLD AUTO: 0 % — SIGNIFICANT CHANGE UP (ref 0–6)
FLUAV AG NPH QL: SIGNIFICANT CHANGE UP
FLUAV AG NPH QL: SIGNIFICANT CHANGE UP
FLUBV AG NPH QL: SIGNIFICANT CHANGE UP
FLUBV AG NPH QL: SIGNIFICANT CHANGE UP
GLUCOSE SERPL-MCNC: 119 MG/DL — HIGH (ref 70–99)
GLUCOSE SERPL-MCNC: 119 MG/DL — HIGH (ref 70–99)
GLUCOSE UR QL: NEGATIVE MG/DL — SIGNIFICANT CHANGE UP
GLUCOSE UR QL: NEGATIVE MG/DL — SIGNIFICANT CHANGE UP
HCG SERPL-ACNC: <1 MIU/ML — SIGNIFICANT CHANGE UP
HCG SERPL-ACNC: <1 MIU/ML — SIGNIFICANT CHANGE UP
HCT VFR BLD CALC: 35.7 % — SIGNIFICANT CHANGE UP (ref 34.5–45)
HCT VFR BLD CALC: 35.7 % — SIGNIFICANT CHANGE UP (ref 34.5–45)
HETEROPH AB TITR SER AGGL: NEGATIVE — SIGNIFICANT CHANGE UP
HETEROPH AB TITR SER AGGL: NEGATIVE — SIGNIFICANT CHANGE UP
HGB BLD-MCNC: 12.5 G/DL — SIGNIFICANT CHANGE UP (ref 11.5–15.5)
HGB BLD-MCNC: 12.5 G/DL — SIGNIFICANT CHANGE UP (ref 11.5–15.5)
IANC: 6.63 K/UL — SIGNIFICANT CHANGE UP (ref 1.8–7.4)
IANC: 6.63 K/UL — SIGNIFICANT CHANGE UP (ref 1.8–7.4)
IMM GRANULOCYTES NFR BLD AUTO: 0.4 % — SIGNIFICANT CHANGE UP (ref 0–0.9)
IMM GRANULOCYTES NFR BLD AUTO: 0.4 % — SIGNIFICANT CHANGE UP (ref 0–0.9)
INR BLD: 1.22 RATIO — HIGH (ref 0.85–1.18)
INR BLD: 1.22 RATIO — HIGH (ref 0.85–1.18)
KETONES UR-MCNC: NEGATIVE MG/DL — SIGNIFICANT CHANGE UP
KETONES UR-MCNC: NEGATIVE MG/DL — SIGNIFICANT CHANGE UP
LEUKOCYTE ESTERASE UR-ACNC: ABNORMAL
LEUKOCYTE ESTERASE UR-ACNC: ABNORMAL
LYMPHOCYTES # BLD AUTO: 0.36 K/UL — LOW (ref 1–3.3)
LYMPHOCYTES # BLD AUTO: 0.36 K/UL — LOW (ref 1–3.3)
LYMPHOCYTES # BLD AUTO: 4.8 % — LOW (ref 13–44)
LYMPHOCYTES # BLD AUTO: 4.8 % — LOW (ref 13–44)
MCHC RBC-ENTMCNC: 32.2 PG — SIGNIFICANT CHANGE UP (ref 27–34)
MCHC RBC-ENTMCNC: 32.2 PG — SIGNIFICANT CHANGE UP (ref 27–34)
MCHC RBC-ENTMCNC: 35 GM/DL — SIGNIFICANT CHANGE UP (ref 32–36)
MCHC RBC-ENTMCNC: 35 GM/DL — SIGNIFICANT CHANGE UP (ref 32–36)
MCV RBC AUTO: 92 FL — SIGNIFICANT CHANGE UP (ref 80–100)
MCV RBC AUTO: 92 FL — SIGNIFICANT CHANGE UP (ref 80–100)
MONOCYTES # BLD AUTO: 0.44 K/UL — SIGNIFICANT CHANGE UP (ref 0–0.9)
MONOCYTES # BLD AUTO: 0.44 K/UL — SIGNIFICANT CHANGE UP (ref 0–0.9)
MONOCYTES NFR BLD AUTO: 5.9 % — SIGNIFICANT CHANGE UP (ref 2–14)
MONOCYTES NFR BLD AUTO: 5.9 % — SIGNIFICANT CHANGE UP (ref 2–14)
NEUTROPHILS # BLD AUTO: 6.63 K/UL — SIGNIFICANT CHANGE UP (ref 1.8–7.4)
NEUTROPHILS # BLD AUTO: 6.63 K/UL — SIGNIFICANT CHANGE UP (ref 1.8–7.4)
NEUTROPHILS NFR BLD AUTO: 88.6 % — HIGH (ref 43–77)
NEUTROPHILS NFR BLD AUTO: 88.6 % — HIGH (ref 43–77)
NITRITE UR-MCNC: NEGATIVE — SIGNIFICANT CHANGE UP
NITRITE UR-MCNC: NEGATIVE — SIGNIFICANT CHANGE UP
NRBC # BLD: 0 /100 WBCS — SIGNIFICANT CHANGE UP (ref 0–0)
NRBC # BLD: 0 /100 WBCS — SIGNIFICANT CHANGE UP (ref 0–0)
NRBC # FLD: 0 K/UL — SIGNIFICANT CHANGE UP (ref 0–0)
NRBC # FLD: 0 K/UL — SIGNIFICANT CHANGE UP (ref 0–0)
PH UR: 7 — SIGNIFICANT CHANGE UP (ref 5–8)
PH UR: 7 — SIGNIFICANT CHANGE UP (ref 5–8)
PLATELET # BLD AUTO: 183 K/UL — SIGNIFICANT CHANGE UP (ref 150–400)
PLATELET # BLD AUTO: 183 K/UL — SIGNIFICANT CHANGE UP (ref 150–400)
POTASSIUM SERPL-MCNC: 3.8 MMOL/L — SIGNIFICANT CHANGE UP (ref 3.5–5.3)
POTASSIUM SERPL-MCNC: 3.8 MMOL/L — SIGNIFICANT CHANGE UP (ref 3.5–5.3)
POTASSIUM SERPL-SCNC: 3.8 MMOL/L — SIGNIFICANT CHANGE UP (ref 3.5–5.3)
POTASSIUM SERPL-SCNC: 3.8 MMOL/L — SIGNIFICANT CHANGE UP (ref 3.5–5.3)
PROT SERPL-MCNC: 7 G/DL — SIGNIFICANT CHANGE UP (ref 6–8.3)
PROT SERPL-MCNC: 7 G/DL — SIGNIFICANT CHANGE UP (ref 6–8.3)
PROT UR-MCNC: SIGNIFICANT CHANGE UP MG/DL
PROT UR-MCNC: SIGNIFICANT CHANGE UP MG/DL
PROTHROM AB SERPL-ACNC: 13.7 SEC — HIGH (ref 9.5–13)
PROTHROM AB SERPL-ACNC: 13.7 SEC — HIGH (ref 9.5–13)
RBC # BLD: 3.88 M/UL — SIGNIFICANT CHANGE UP (ref 3.8–5.2)
RBC # BLD: 3.88 M/UL — SIGNIFICANT CHANGE UP (ref 3.8–5.2)
RBC # FLD: 12.6 % — SIGNIFICANT CHANGE UP (ref 10.3–14.5)
RBC # FLD: 12.6 % — SIGNIFICANT CHANGE UP (ref 10.3–14.5)
RSV RNA NPH QL NAA+NON-PROBE: SIGNIFICANT CHANGE UP
RSV RNA NPH QL NAA+NON-PROBE: SIGNIFICANT CHANGE UP
SARS-COV-2 RNA SPEC QL NAA+PROBE: DETECTED
SARS-COV-2 RNA SPEC QL NAA+PROBE: DETECTED
SODIUM SERPL-SCNC: 133 MMOL/L — LOW (ref 135–145)
SODIUM SERPL-SCNC: 133 MMOL/L — LOW (ref 135–145)
SP GR SPEC: 1.02 — SIGNIFICANT CHANGE UP (ref 1–1.03)
SP GR SPEC: 1.02 — SIGNIFICANT CHANGE UP (ref 1–1.03)
UROBILINOGEN FLD QL: 0.2 MG/DL — SIGNIFICANT CHANGE UP (ref 0.2–1)
UROBILINOGEN FLD QL: 0.2 MG/DL — SIGNIFICANT CHANGE UP (ref 0.2–1)
WBC # BLD: 7.48 K/UL — SIGNIFICANT CHANGE UP (ref 3.8–10.5)
WBC # BLD: 7.48 K/UL — SIGNIFICANT CHANGE UP (ref 3.8–10.5)
WBC # FLD AUTO: 7.48 K/UL — SIGNIFICANT CHANGE UP (ref 3.8–10.5)
WBC # FLD AUTO: 7.48 K/UL — SIGNIFICANT CHANGE UP (ref 3.8–10.5)

## 2023-12-25 PROCEDURE — 93010 ELECTROCARDIOGRAM REPORT: CPT

## 2023-12-25 PROCEDURE — 99285 EMERGENCY DEPT VISIT HI MDM: CPT

## 2023-12-25 PROCEDURE — 71045 X-RAY EXAM CHEST 1 VIEW: CPT | Mod: 26

## 2023-12-25 RX ORDER — KETOROLAC TROMETHAMINE 30 MG/ML
15 SYRINGE (ML) INJECTION ONCE
Refills: 0 | Status: DISCONTINUED | OUTPATIENT
Start: 2023-12-25 | End: 2023-12-25

## 2023-12-25 RX ORDER — ACETAMINOPHEN 500 MG
650 TABLET ORAL ONCE
Refills: 0 | Status: COMPLETED | OUTPATIENT
Start: 2023-12-25 | End: 2023-12-25

## 2023-12-25 RX ORDER — LIDOCAINE 4 G/100G
10 CREAM TOPICAL ONCE
Refills: 0 | Status: COMPLETED | OUTPATIENT
Start: 2023-12-25 | End: 2023-12-25

## 2023-12-25 RX ORDER — SODIUM CHLORIDE 9 MG/ML
1000 INJECTION INTRAMUSCULAR; INTRAVENOUS; SUBCUTANEOUS ONCE
Refills: 0 | Status: COMPLETED | OUTPATIENT
Start: 2023-12-25 | End: 2023-12-25

## 2023-12-25 RX ADMIN — Medication 15 MILLIGRAM(S): at 11:30

## 2023-12-25 RX ADMIN — Medication 15 MILLIGRAM(S): at 10:53

## 2023-12-25 RX ADMIN — Medication 650 MILLIGRAM(S): at 08:30

## 2023-12-25 RX ADMIN — SODIUM CHLORIDE 1000 MILLILITER(S): 9 INJECTION INTRAMUSCULAR; INTRAVENOUS; SUBCUTANEOUS at 08:45

## 2023-12-25 RX ADMIN — Medication 650 MILLIGRAM(S): at 07:59

## 2023-12-25 RX ADMIN — LIDOCAINE 10 MILLILITER(S): 4 CREAM TOPICAL at 08:00

## 2023-12-25 RX ADMIN — SODIUM CHLORIDE 1000 MILLILITER(S): 9 INJECTION INTRAMUSCULAR; INTRAVENOUS; SUBCUTANEOUS at 07:59

## 2023-12-25 NOTE — ED ADULT NURSE REASSESSMENT NOTE - NS ED NURSE REASSESS COMMENT FT1
Received report from PM RN. Patient has family at bedside. Patient is comfortable and VS recorded. No difficulty breathing. Sinus tachycardia noted. Patient waiting for ENT consult.  WANDA Rodriguez

## 2023-12-25 NOTE — CONSULT NOTE ADULT - ASSESSMENT
28y Female previously healthy who presents to the ED with sore throat and generalized malaise for the past few days. FOE with mild cobblestoning of pharyngeal mucosa, mild hyperemia likely secondary to viral URI. She is found to be COVID+.    Plan:  - Symptomatic management of COVID symptoms, history and physical exam not consistent with epiglottitis  - No acute ENT intervention    Please page ENT with any further questions or concerns.

## 2023-12-25 NOTE — ED PROVIDER NOTE - PATIENT PORTAL LINK FT
You can access the FollowMyHealth Patient Portal offered by [facility] by registering at the following website: [email or web address]. By joining [email or web address]’s FollowMyHealth portal, you will also be able to view your health information using other applications (apps) compatible with our system. You can access the FollowMyHealth Patient Portal offered by [facility] by registering at the following website: [email or web address]. By joining [employer]’s FollowMyHealth portal, you will also be able to view your health information using other applications (apps) compatible with our system.

## 2023-12-25 NOTE — ED ADULT NURSE NOTE - OBJECTIVE STATEMENT
27 y/o F presents to ED room 27 A&Ox4 c/o sore throat/ fevers. pt endorsing sore throat/ burning, fevers (102.4 at home), malaise x 3 days. endorsing some difficulty swallowing. denies SOB, c/p, weakness, HA, difficulty breathing, n/v/d. no stridor, drooling noted. pt diagnosed with Epiglottitis last month, prescribed Clindamycin. 2 weeks ago, pt had follow up appt with ENT, pt was told there was still edema, but nothing else to be done at this time. respirations even and unlabored, o2 saturation 99% on RA. placed on continuos monitor, sinus tachycardia noted to 130s. EKG performed. no acute distress noted at this time. awaiting orders. safety maintained, side rails up. family at bedside. call bell within reach 29 y/o F presents to ED room 27 A&Ox4 c/o sore throat/ fevers. pt endorsing sore throat/ burning, fevers (102.4 at home), malaise x 3 days. endorsing some difficulty swallowing. denies SOB, c/p, weakness, HA, difficulty breathing, n/v/d. no stridor, drooling noted. pt diagnosed with Epiglottitis last month, prescribed Clindamycin. 2 weeks ago, pt had follow up appt with ENT, pt was told there was still edema, but nothing else to be done at this time. respirations even and unlabored, o2 saturation 99% on RA. placed on continuos monitor, sinus tachycardia noted to 130s. EKG performed. no acute distress noted at this time. awaiting orders. safety maintained, side rails up. family at bedside. call bell within reach 27 y/o F presents to ED room 27 A&Ox4 c/o sore throat/ fevers. pt endorsing sore throat/ burning, fevers (102.4 at home), malaise x 3 days. endorsing some difficulty swallowing. denies SOB, c/p, weakness, HA, difficulty breathing, n/v/d. no stridor, drooling noted. pt diagnosed with Epiglottitis last month, prescribed Clindamycin. 2 weeks ago, pt had follow up appt with ENT, pt was told minor edema was still present, but nothing else to be done at that time. respirations even and unlabored, o2 saturation 99% on RA. placed on continuos monitor, sinus tachycardia noted to 130s. EKG performed. no acute distress noted at this time. awaiting orders. safety maintained, side rails up. family at bedside. call bell within reach

## 2023-12-25 NOTE — ED ADULT NURSE NOTE - NSFALLUNIVINTERV_ED_ALL_ED
Bed/Stretcher in lowest position, wheels locked, appropriate side rails in place/Call bell, personal items and telephone in reach/Instruct patient to call for assistance before getting out of bed/chair/stretcher/Non-slip footwear applied when patient is off stretcher/[place] to call system/Physically safe environment - no spills, clutter or unnecessary equipment/Purposeful proactive rounding/Room/bathroom lighting operational, light cord in reach Bed/Stretcher in lowest position, wheels locked, appropriate side rails in place/Call bell, personal items and telephone in reach/Instruct patient to call for assistance before getting out of bed/chair/stretcher/Non-slip footwear applied when patient is off stretcher/[place] to call system/Physically safe environment - no spills, clutter or unnecessary equipment/Purposeful proactive rounding/Room/bathroom lighting operational, light cord in reach

## 2023-12-25 NOTE — ED PROVIDER NOTE - CLINICAL SUMMARY MEDICAL DECISION MAKING FREE TEXT BOX
Mandy: likely viral in etiology, pt doesn't look toxic though given possible hx of epiglottitis/frequently being sick over the past 2 months will do labs, cultures, xray, urine, fluids, antipyretics. ENT for laryngoscope. Reassess. Mandy: likely viral in etiology, pt doesn't look toxic though given possible hx of epiglottitis/frequently being sick over the past 2 months will do labs, cultures, xray, urine, fluids, antipyretics. ENT for laryngoscope. Reassess.  ===================================  update (Jovi Galvan, ; emergency medicine physician): pt seen by ENT, no swelling to epiglottis or larynx, slight cobblestoning to posterior pharynx c/w uri. Pts cbc/cmp reviewed and not actionable, vbg without elevated lactate. Pts covid positive. UA with epithelial cells but pt without urinary sxs, Ucx sent. Pt feeling better after meds and wants to go home. Explained to pt that she should not continue clindamycin. Pt aware of results and anticipatory guidance explained and return precaitions explained, pt expresses verbal understanding. Pt safe for discharge with outpt f/u.

## 2023-12-25 NOTE — ED PROVIDER NOTE - NSFOLLOWUPINSTRUCTIONS_ED_ALL_ED_FT
- Please follow up with your Primary Care Doctor within 1-2 weeks. Bring your results from today.    - Tylenol up to 650 mg every 6 hours as needed for pain/fevers and/or Ibuprofen up to 400 mg every 6 hours as needed for pain/fevers.    - Stay well hydrated.    - Be sure to return to the ED if you develop new, worsening, or any distressing symptoms.

## 2023-12-25 NOTE — ED PROVIDER NOTE - QTC
I personally evaluated the patient. I reviewed the Resident’s or Physician Assistant’s note (as assigned above), and agree with the findings and plan except as documented in my note.    Pt is a 63 y/o male with multiple medical problems who presents to ED with left upper frontal tooth pain, necrotic tooth. Sx's are moderate, constant, worse since onset. No trauma.     Constitutional: Well developed, well nourished. NAD.  Head: Normocephalic.  Eyes: EOMI.  ENT: + dental caries. Left upper frontal tooth necrotic with no abscess.  Cardiovascular: Normal S1, S2. Regular rate and rhythm. No murmurs, rubs, or gallops.  Pulmonary: Normal respiratory rate and effort. Lungs clear to auscultation bilaterally. No wheezing, rales, or rhonchi.  Neuro: No focal neurological deficits. [phone]

## 2023-12-25 NOTE — ED ADULT NURSE REASSESSMENT NOTE - NSFALLUNIVINTERV_ED_ALL_ED
Bed/Stretcher in lowest position, wheels locked, appropriate side rails in place/Call bell, personal items and telephone in reach/Instruct patient to call for assistance before getting out of bed/chair/stretcher/Non-slip footwear applied when patient is off stretcher/[place] to call system/Physically safe environment - no spills, clutter or unnecessary equipment/Purposeful proactive rounding/Room/bathroom lighting operational, light cord in reach Bed/Stretcher in lowest position, wheels locked, appropriate side rails in place/Call bell, personal items and telephone in reach/Instruct patient to call for assistance before getting out of bed/chair/stretcher/Non-slip footwear applied when patient is off stretcher/[place] to call system/Physically safe environment - no spills, clutter or unnecessary equipment/Purposeful proactive rounding/Room/bathroom lighting operational, light cord in reach

## 2023-12-25 NOTE — ED ADULT TRIAGE NOTE - CHIEF COMPLAINT QUOTE
Pt c/o sore throat and fever (tmax 102.4F) x2 days. Endorses mild difficulty swallowing. Denies difficulty breathing. PMHx epiglottitis 1x month ago

## 2023-12-25 NOTE — ED PROVIDER NOTE - ATTENDING CONTRIBUTION TO CARE
I (Jadyn) agree with above, I performed a history and physical. Counseled stephen medical staff, physician assistant, and/or medical student on medical decision making as documented. Medical decisions and treatment interventions were made in real time during the patient encounter. Additionally and/or with the following exceptions: Agree with above the medical decision making interpretation of results represents the conversation I had with the fellow.

## 2023-12-25 NOTE — ED PROVIDER NOTE - OBJECTIVE STATEMENT
28F no signif PMH though recently dx epiglottitis by ENT (they state that pt didn't have epiglottitis a month ago but slightly swollen epiglottis likely from a uri, though pt treated with clindamycin at the time) presents to the ED with sore throat since yesterday. Pt started on clindamycin yesterday though woke up today with worsening sore throat and fever so came to the ED. Pt took ibuprofen at 6:30am with mild relief. Pt does report a change in her voice. Denies any difficulty breathing, excessive drooling, chest pain, abd pain, n/v/d, urinary sxs.     Allergies: penicillin (rash?)

## 2023-12-25 NOTE — ED PROVIDER NOTE - PHYSICAL EXAMINATION
GENERAL: no acute distress, non-toxic appearing  HEENT: normal conjunctiva, oral mucosa moist, uvula midline, no tongue elevation, no stridor/excessive drooling, no neck mass  CARDIAC: tachy rate to 130s and regular rhythm, bp reassuring  PULM: clear to ascultation bilaterally, no increased work of breathing  GI: abdomen nondistended, soft, nontender  : no suprapubic tenderness  NEURO: alert and oriented x 3, normal speech, moving all extremities without lateralization  MSK: no visible deformities, no peripheral edema, calf tenderness/redness/swelling  SKIN: no visible rashes  PSYCH: appropriate mood and affect

## 2023-12-25 NOTE — CONSULT NOTE ADULT - SUBJECTIVE AND OBJECTIVE BOX
HPI: 28y Female previously healthy who presents to the ED with sore throat and generalized malaise for the past few days. She woke up this morning and had a fever to 102 so decided to present to the ED. She was started on Clindamycin by urgent care yesterday but states this did not help her symptoms. She has significant odynophagia but is tolerating PO, denies any excessive drooling, respiratory distress, stridor, or difficulty breathing. She reports that she is more hoarse than normal. Had symptoms similar to this one month ago during which a flexible laryngoscopy was performed and found mild epiglottic edema, likely 2/2 viral URI.     Allergies    No Known Allergies    Intolerances        PAST MEDICAL & SURGICAL HISTORY:      FAMILY HISTORY:        MEDICATIONS:      All other PRN medications:      Vital Signs Last 24 Hrs  T(C): 37.3 (25 Dec 2023 08:59), Max: 37.8 (25 Dec 2023 06:36)  T(F): 99.2 (25 Dec 2023 08:59), Max: 100 (25 Dec 2023 06:36)  HR: 113 (25 Dec 2023 08:59) (113 - 130)  BP: 114/78 (25 Dec 2023 08:59) (114/78 - 128/78)  BP(mean): --  RR: 18 (25 Dec 2023 08:59) (18 - 18)  SpO2: 99% (25 Dec 2023 08:59) (99% - 99%)    Parameters below as of 25 Dec 2023 08:59  Patient On (Oxygen Delivery Method): room air        LABS:  CBC-    12-25    133<L>  |  102  |  11  ----------------------------<  119<H>  3.8   |  21<L>  |  0.82    Ca    8.9      25 Dec 2023 07:45    TPro  7.0  /  Alb  4.4  /  TBili  0.2  /  DBili  x   /  AST  15  /  ALT  11  /  AlkPhos  55  12-25    Coagulation Studies-  PT/INR - ( 25 Dec 2023 07:45 )   PT: 13.7 sec;   INR: 1.22 ratio         PTT - ( 25 Dec 2023 07:45 )  PTT:29.2 sec  Endocrine Panel-  --  --  8.9 mg/dL      Physical Exam:  General: well-developed, NAD  OU: EOMI; PERRL; no drainage or redness  AU: external ears normal  Nose: nares patent  Mouth: No oral lesions; no gross abnormalities. tonsils 1+, no exudates. posterior oropharyngeal mucosa erythematous  Neck: trachea midline  Respiratory: unlabored respirations  Cardiovascular: regular rate  Gastrointestinal: Soft, nondistended  Extremities: No edema, warm and well perfused  Skin: No lesions; no rash    Procedure: Flexible laryngoscopy    Pre-procedure diagnosis/Indication for procedure: To evaluate larynx    Description:    A flexible endoscope was used to examine the left and right nasal cavities, posterior oropharynx, hypopharynx, and larynx. The nasal valve areas were examined for abnormalities or collapse. The inferior and middle turbinates were evaluated. The middle and superior meatuses, the sphenoethmoid recesses, and the nasopharynx were examined and inspected for mucopurulence, polyps, and nasal masses. The oropharynx, tongue base/vallecula, epiglottis, aryepiglottic folds, arytenoids, vocal cords, hypopharynx were also inspected for swelling, inflammation, or dysfunction.    Nasopharynx wnl  BOT/vallecula normal  Epiglottis sharp  AE folds nonedematous / mild hyperemia  Arytenoids mobile  Vocal folds mobile bilaterally  No masses or lesions visualized in post cricoid space or pyriform sinuses bilaterally  No active bleeding or significant obstruction noted in supraglottic area. +posterior pharyngeal wall mucosal cobblestoning

## 2023-12-25 NOTE — ED PROVIDER NOTE - PROGRESS NOTE DETAILS
Mandy: spoke with ENT they will see pt, they state that pt didn't have epiglottitis a month ago but slightly swollen epiglottis likely from a uri. Pt being treated as septic though from likely viral source. Abx being held.

## 2023-12-25 NOTE — ED PROVIDER NOTE - OTHER FINDINGS
No ST elevations or depressions; no contiguous pathologic t wave inversions.  No gross interval abnormalities.  No signs of Brugada, WPW or prolonged QT.

## 2023-12-26 LAB
CULTURE RESULTS: SIGNIFICANT CHANGE UP
CULTURE RESULTS: SIGNIFICANT CHANGE UP
EBV EA AB SER IA-ACNC: <5 U/ML — SIGNIFICANT CHANGE UP
EBV EA AB SER IA-ACNC: <5 U/ML — SIGNIFICANT CHANGE UP
EBV EA AB TITR SER IF: POSITIVE
EBV EA AB TITR SER IF: POSITIVE
EBV EA IGG SER-ACNC: NEGATIVE — SIGNIFICANT CHANGE UP
EBV EA IGG SER-ACNC: NEGATIVE — SIGNIFICANT CHANGE UP
EBV NA IGG SER IA-ACNC: >600 U/ML — HIGH
EBV NA IGG SER IA-ACNC: >600 U/ML — HIGH
EBV PATRN SPEC IB-IMP: SIGNIFICANT CHANGE UP
EBV PATRN SPEC IB-IMP: SIGNIFICANT CHANGE UP
EBV VCA IGG AVIDITY SER QL IA: POSITIVE
EBV VCA IGG AVIDITY SER QL IA: POSITIVE
EBV VCA IGM SER IA-ACNC: 117 U/ML — HIGH
EBV VCA IGM SER IA-ACNC: 117 U/ML — HIGH
EBV VCA IGM SER IA-ACNC: 22.4 U/ML — SIGNIFICANT CHANGE UP
EBV VCA IGM SER IA-ACNC: 22.4 U/ML — SIGNIFICANT CHANGE UP
EBV VCA IGM TITR FLD: NEGATIVE — SIGNIFICANT CHANGE UP
EBV VCA IGM TITR FLD: NEGATIVE — SIGNIFICANT CHANGE UP
SPECIMEN SOURCE: SIGNIFICANT CHANGE UP
SPECIMEN SOURCE: SIGNIFICANT CHANGE UP

## 2023-12-30 LAB
CULTURE RESULTS: SIGNIFICANT CHANGE UP
SPECIMEN SOURCE: SIGNIFICANT CHANGE UP

## 2024-01-10 ENCOUNTER — APPOINTMENT (OUTPATIENT)
Dept: OTOLARYNGOLOGY | Facility: CLINIC | Age: 29
End: 2024-01-10
Payer: SELF-PAY

## 2024-01-10 PROCEDURE — D0090A COSMETIC BOTOX: CUSTOM

## 2024-01-12 ENCOUNTER — APPOINTMENT (OUTPATIENT)
Dept: PSYCHIATRY | Facility: CLINIC | Age: 29
End: 2024-01-12
Payer: COMMERCIAL

## 2024-01-12 PROCEDURE — 99205 OFFICE O/P NEW HI 60 MIN: CPT

## 2024-01-12 NOTE — PHYSICAL EXAM
[None] : none [Cooperative] : cooperative [Anxious] : anxious [Full] : full [Clear] : clear [Linear/Goal Directed] : linear/goal directed [Average] : average [WNL] : within normal limits [FreeTextEntry8] : "nervous" [de-identified] : anxious to dysphoric,

## 2024-01-12 NOTE — SOCIAL HISTORY
[FreeTextEntry1] : Born: Eliseo Autsin Grew up: [place]. Mother, father, only child. Father passed away when pt was 13 years old. Father: good relationship, similar personalities so would "butt heads" on things. He was a  in the city, in a union, worked very hard. He got sick when pt was in middle school with pulmonary fibrosis. Also had colon cancer, started chemotherapy, then decompensated. He was sick for 6-8 months before passing away. Paternal grandfather and grandmother passed away within 3 months of pt's father. Pt's best friend from high school passed away in 2016, godfather  in 2019. Pt said that she "just buried everything down and moved on." Mother: home until pt was in . Very present, supportive, she was pt's best friend. Pt does not remember parts of her childhood. Education/GPA: Bachelor's degree in psychology major and minor in management with 3.4 GPA

## 2024-01-12 NOTE — DISCUSSION/SUMMARY
[FreeTextEntry1] : RISK ASSESSMENT Acute risk factors for self-injurious/aggressive behaviors include: depressive symptoms, anxiety, thoughts of death without intent several weeks ago Chronic risk factors include: hx trauma, hx depression and anxiety, hx binge alcohol use and hx MJ use Protective factors include: domiciled, engaged, future oriented, able to safety plan, denies current SIIP/NSSIIP/HIIP, no past SAs, no past psych hosp.  Overall, the pt is at a low acute risk for self-injurious, suicidal or aggressive behaviors, and is appropriate for outpatient care at this time.

## 2024-01-12 NOTE — HISTORY OF PRESENT ILLNESS
[FreeTextEntry1] :  Pt seen by writer for initial appointment on 1/12/2024. 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  Pt said that she has been going to therapy on and off since 14 years old, father passed away when pt was 13 years old. Pt stopped therapy when she went to college. About age 23 years old pt restarted therapy, then stopped, restarted 1.5 years ago. For past 6 months pt "have not been feeling like myself, low energy, sad all the time." It was especially bad Oct-Dec 2023, crying a lot, feeling burnt out with work. Pt said that she takes work personally, and feels it takes a toll on her, takes it out on fiance and feels it impacts her relationship. 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 Pt said that she has not had these thoughts in "a few weeks."   For the past 2 weeks has been "ok."  Denies labile relationships. Endorses stable emotions generally.  Describes self as detail oriented, perfectionist, hard to delegate tasks.   Mood: "nervous" Anhedonia: denies, enjoys spending time with her dog and bowling Motivation: low Sleep: asleep 10-11pm, wakes up 5:30am-7am, 5-6 interruptions.  Appetite: "fine" Energy: low Concentration: low Feelings of guilt/worthlessness: endorses SI: hx thoughts of driving into a divider when driving over a bridge. Describes the thoughts as "unwanted". Denies researching how to kill self, denies taking steps towards death, denies past or current intent. NSSI: denies past or current HI: denies past or current, though endorses getting very angry in arguments with fiance   Anxiety: endorses about everything, specifically job, finances (concerned about mother's finances, she was a , pt had given her money to help pay off the mortgage, pt feels guilt towards being responsible for her mother) Muscle soreness: endorses Irritability: endorses Easily fatigued: endorses +dysregulated sleep: endorses Panic attacks: most recently 2 months ago, one out of the blue happened 2 years, sporadic, on average once a year or less. Feels heart racing, lips turn blue, dizzy, nauseous, tingling sensation, feels hot and cold sensation, feels like going to die, derealization/depersonalization.  Denies avoidance behavior.   Disordered eating Hx: denies past or current binging/purging/restricting.  EtOH use: 2-10 cocktails or high noons when going out more in summer once a month, and 1-2 times a week has 1-2 glasses of wine Drug use: MJ joint or edible, was every night, last was 2 nights ago first time in months. Denies other drug.  Tobacco use: denies past or current Caffeine use: 1-2 cups of coffee per day, rarely soda   Current or past psychosis: denies past or current Current or past prieto: denies past or current   Trauma: emotional trauma Denies avoidance behavior. Nightmares: Has dreams thinking something is on the wall, in her closet, or coming out of the air conditioner vent. Wakes up shaking. Can range from nightly to not happening for months on end. Most recently 2 nights ago. Flashbacks: denies Denies dissociative symptoms. Hypervigilance:  denies beyond normal Denies being easily startled   Firearm access: juliocesar has gun in home, ammunition kept separate from gun.  Current medications: OCP, vit C, zinc, magnesium and D3, Advil or Tylenol prn,  Therapy: in therapy for past 1.5 years with Dr. Mavis Perez  Safety plan:  Triggers: driving home from work, argument with juliocesar, feeling sad Internal coping:  cry it out, "take my dog for a walk", listen to music, binge watch a show Distractions: Omaira (friend), Fang [phone] (Friend) People to help:  Omaira (friend), Fang [phone] (Friend) Professionals to help: therapist Mavis Perez Keep environment safe: put fijered's gun in a safe Most important worth living for: "my fiance and my dog, my family."  Denies hx cardiac issues, denies seizure history. Vitals reviewed. [FreeTextEntry2] : On/off therapy since age 14 years old. Hx depressed mood, thoughts of death, low energy, poor concentration, feelings guilt/worthlessness, dysregulated sleep, +PMR.

## 2024-01-12 NOTE — PLAN
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 The differential includes MDD, anxiety, panic attacks, MJ use, binge EtOH use. Plan to start Wellbutrin XL 150mg daily.  Labs: 10/14/2022: LFTs wnl, vit B12 200 L, TSH 3.49 1/6/2023: CBC wnl, BMP wnl, vit B12 381 6/14/2023: vit B12 413   PLAN -Discussed the diagnosis, treatment, alternatives to recommended treatment, risk Vs benefits of treatment and no treatment and alternative treatments. -Medication: Start Wellbutrin XL 150mg daily.  Discussed risks and benefits of Wellbutrin including but not limited to decreased seizure threshold, dizziness, lightheadedness, insomnia, dry mouth, constipation, weight loss, anorexia, hypertension, sweating, rash, GI upset, tremor, tinnitus, induction of prieto. -Discussed recommendation for aerobic exercise -Discussed sleep hygiene -Educated patient of importance of remaining abstinent from drugs and alcohol. -Emergency procedures were discussed: pt. educated to call 911 or go to nearest ER for worsening of symptoms/suicidal/homicidal ideation. -Individual therapy with Mavis Perez, pt to provide consent to contact. -RTC in 2 weeks or earlier as needed -Patient given opportunity to ask questions and their questions were answered and they expressed understanding and agreement with above plan.  I spent a total of 60 minutes reviewing past medical records, evaluating the patient, discussing treatment options with the patient, prescribing medication, and documenting in the EMR.

## 2024-01-18 NOTE — ASSESSMENT
[FreeTextEntry1] : 28 year old female with desire for botox - sp 26 units today.   We discussed botox for TMJ arthalgia to relieve headaches/bruxism symptoms.

## 2024-01-18 NOTE — PROCEDURE
[FreeTextEntry1] : Botox [FreeTextEntry2] : Facial aging [FreeTextEntry3] : JOAO SANDHU presents for botox injection. All risks, benefits, and alternatives were explained to the patient, including risk of asymmetry, ptosis, need for touch-up. The risks of bruising and edema were also discussed. The patient must wait 7-10 days for full effect of botox. The patient gave written informed consent to proceed.   Procedure: The area was cleaned with alcohol wipes. The area was injected 1 cc syringe with 30G needle.   Dilution: 0.1 cc = 4 U, dilution 2.5 cc NS in 100 U vial Product used: Botox Areas injected:  Glabella: 16 Forehead: 10 Crow's feet: Nasalis: Lip: Lot# see diagram Exp: see diagram  Ice was applied to the area after procedure. The patient was given post-operative instructions.

## 2024-01-18 NOTE — HISTORY OF PRESENT ILLNESS
[de-identified] : 28 year old female with desire for botox. She has never had and would like to address forehead.    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 No improvement with NSAIDs, soft diet. She has been using a mouth guard. Reports pain and headaches.

## 2024-01-24 ENCOUNTER — LABORATORY RESULT (OUTPATIENT)
Age: 29
End: 2024-01-24

## 2024-01-24 ENCOUNTER — APPOINTMENT (OUTPATIENT)
Dept: FAMILY MEDICINE | Facility: CLINIC | Age: 29
End: 2024-01-24
Payer: COMMERCIAL

## 2024-01-24 VITALS
OXYGEN SATURATION: 97 % | HEART RATE: 91 BPM | WEIGHT: 164 LBS | BODY MASS INDEX: 26.36 KG/M2 | TEMPERATURE: 98.6 F | DIASTOLIC BLOOD PRESSURE: 82 MMHG | RESPIRATION RATE: 16 BRPM | SYSTOLIC BLOOD PRESSURE: 121 MMHG | HEIGHT: 66 IN

## 2024-01-24 DIAGNOSIS — Z01.84 ENCOUNTER FOR ANTIBODY RESPONSE EXAMINATION: ICD-10-CM

## 2024-01-24 DIAGNOSIS — Z00.00 ENCOUNTER FOR GENERAL ADULT MEDICAL EXAMINATION W/OUT ABNORMAL FINDINGS: ICD-10-CM

## 2024-01-24 DIAGNOSIS — R79.89 OTHER SPECIFIED ABNORMAL FINDINGS OF BLOOD CHEMISTRY: ICD-10-CM

## 2024-01-24 DIAGNOSIS — F32.4 MAJOR DEPRESSIVE DISORDER, SINGLE EPISODE, IN PARTIAL REMISSION: ICD-10-CM

## 2024-01-24 PROCEDURE — 36415 COLL VENOUS BLD VENIPUNCTURE: CPT

## 2024-01-24 PROCEDURE — 99395 PREV VISIT EST AGE 18-39: CPT | Mod: 25

## 2024-01-24 PROCEDURE — 96127 BRIEF EMOTIONAL/BEHAV ASSMT: CPT

## 2024-01-24 RX ORDER — LIDOCAINE HYDROCHLORIDE 40 MG/ML
4 SOLUTION TOPICAL
Qty: 1 | Refills: 0 | Status: DISCONTINUED | COMMUNITY
Start: 2023-06-20 | End: 2024-01-24

## 2024-01-24 RX ORDER — BENZOYL PEROXIDE 5 G/100G
5 LIQUID TOPICAL
Qty: 1 | Refills: 10 | Status: DISCONTINUED | COMMUNITY
Start: 2023-06-30 | End: 2024-01-24

## 2024-01-24 RX ORDER — CLINDAMYCIN PHOSPHATE 1 G/10ML
1 GEL TOPICAL
Qty: 1 | Refills: 9 | Status: DISCONTINUED | COMMUNITY
Start: 2023-06-30 | End: 2024-01-24

## 2024-01-24 NOTE — REVIEW OF SYSTEMS
[Fever] : no fever [Chills] : no chills [Earache] : no earache [Nasal Discharge] : no nasal discharge [Sore Throat] : no sore throat [Chest Pain] : no chest pain [Palpitations] : no palpitations [Shortness Of Breath] : no shortness of breath [Wheezing] : no wheezing [Cough] : no cough [Nausea] : no nausea [Constipation] : constipation [Diarrhea] : diarrhea [Vomiting] : no vomiting [Heartburn] : no heartburn [Dysuria] : no dysuria [Headache] : no headache [Dizziness] : no dizziness [Suicidal] : not suicidal [Depression] : depression [FreeTextEntry7] : tending towards constipation-benefiber helping [de-identified] : Sleeping 5-6 hours a night; waking up in the middle of the night; frequent wake ups during the night; no snoring

## 2024-01-24 NOTE — HEALTH RISK ASSESSMENT
[Yes] : Yes [2 - 4 times a month (2 pts)] : 2-4 times a month (2 points) [1] : 2) Feeling down, depressed, or hopeless for several days (1) [Several Days (1)] : 2.) Feeling down, depressed or hopeless? Several days [Nearly Every Day (3)] : 4.) Feeling tired or having little energy? Nearly every day [Not at All (0)] : 8.) Moving or speaking so slowly that other people could have noticed, or the opposite, moving or speaking faster than usual? Not at all [Somewhat Difficult] : How difficult have these problems made it for you to do your work, take care of things at home, or get along with people? 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

## 2024-01-24 NOTE — HISTORY OF PRESENT ILLNESS
Is This A New Presentation, Or A Follow-Up?: Skin Lesion How Severe Is Your Skin Lesion?: moderate Has Your Skin Lesion Been Treated?: not been treated [FreeTextEntry1] : Ms. SANDHU presents for annual physical. [de-identified] : Ms. SANDHU presents for annual physical.  Went to ER for COVID in December.    Had colonoscopy-summer.    Taking Vit B12

## 2024-01-26 ENCOUNTER — APPOINTMENT (OUTPATIENT)
Dept: PSYCHIATRY | Facility: CLINIC | Age: 29
End: 2024-01-26
Payer: COMMERCIAL

## 2024-01-26 PROCEDURE — 99214 OFFICE O/P EST MOD 30 MIN: CPT | Mod: 95

## 2024-01-26 NOTE — HISTORY OF PRESENT ILLNESS
[FreeTextEntry1] : Social: Pt's juliocesar spilled ketchup over the rug, and pt noticed that she was able to remain calm and be less overwhelmed. Work is busier than usual, restructuring going on, unknown about the future.  Medications: Wellbutrin: denies side effects, endorses adherence, feels it is helping, plan to increase to 300mg daily.  Mood: "fine" Anxiety: average 6-7/10, worse with work. Panic attacks: denies Motivation: "a little better", went to gym three times this past week, more consistent than usual, up from 1-2 days per week previously.  Anhedonia: denies Appetite: "pretty much the same" Sleep: unchanged, asleep 10-11pm, wakes up 5:30am-7am, 5-6 interruptions.  Nightmares: most recently last week, average 2-3 times per week.  Energy: "ups and downs", more days are upbeat than prior Focus: "the same", low  Guilt/worthlessness: denies Thoughts of death: denies Thoughts of harming self: denies Thoughts of harming others: denies  EtOH use: 1-2 glasses of wine 3 days in past 2 weeks. Drug use: denies during interval Tobacco use: denies Caffeine use: 1-2 cups of coffee per day, rarely soda.

## 2024-01-26 NOTE — REASON FOR VISIT
[Patient preference] : as per patient preference [Continuing, patient seen in-person within last 12 months] : Telehealth services are continuing as patient has been seen in-person within last 12 months. [Telehealth (audio & video) - Individual/Group] : This visit was provided via telehealth using real-time 2-way audio visual technology. [Medical Office: ([facility])___] : The provider was located at the medical office in [unfilled]. [Home] : The patient, [unfilled], was located at home, [unfilled], at the time of the visit. [Verbal consent obtained from patient/other participant(s)] : Verbal consent for telehealth/telephonic services obtained from patient/other participant(s) [Patient] : Patient [FreeTextEntry4] : 9:15am

## 2024-01-26 NOTE — PHYSICAL EXAM
[None] : none [Cooperative] : cooperative [Anxious] : anxious [Full] : full [Clear] : clear [Linear/Goal Directed] : linear/goal directed [Average] : average [WNL] : within normal limits [FreeTextEntry8] : "nervous" [de-identified] : anxious to dysphoric,

## 2024-01-26 NOTE — PLAN
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 The differential includes MDD, anxiety, panic attacks, MJ use, binge EtOH use. Plan to start Wellbutrin XL 150mg daily.  On follow up assessments: 1/26/2024: Anxiety 6-7/10, improving energy/motivation and frustration tolerance. Increase Wellbutrin XL to 300mg daily.  Labs: 10/14/2022: LFTs wnl, vit B12 200 L, TSH 3.49 1/6/2023: CBC wnl, BMP wnl, vit B12 381 6/14/2023: vit B12 413  PLAN -Discussed the diagnosis, treatment, alternatives to recommended treatment, risk Vs benefits of treatment and no treatment and alternative treatments. -Medication: Increase Wellbutrin XL to 300mg daily.  Discussed risks and benefits of Wellbutrin including but not limited to decreased seizure threshold, dizziness, lightheadedness, insomnia, dry mouth, constipation, weight loss, anorexia, hypertension, sweating, rash, GI upset, tremor, tinnitus, induction of prieto. -Discussed recommendation for aerobic exercise -Discussed sleep hygiene -Educated patient of importance of remaining abstinent from drugs and alcohol. -Emergency procedures were discussed: pt. educated to call 911 or go to nearest ER for worsening of symptoms/suicidal/homicidal ideation. -Individual therapy with Mavis Perez, pt to provide consent to contact. -RTC in 2 weeks or earlier as needed -Patient given opportunity to ask questions and their questions were answered and they expressed understanding and agreement with above plan.

## 2024-01-28 LAB
ALBUMIN SERPL ELPH-MCNC: 4.9 G/DL
ALP BLD-CCNC: 57 U/L
ALT SERPL-CCNC: 13 U/L
ANION GAP SERPL CALC-SCNC: 12 MMOL/L
AST SERPL-CCNC: 16 U/L
BASOPHILS # BLD AUTO: 0.06 K/UL
BASOPHILS NFR BLD AUTO: 0.9 %
BILIRUB SERPL-MCNC: <0.2 MG/DL
BUN SERPL-MCNC: 12 MG/DL
CALCIUM SERPL-MCNC: 9.4 MG/DL
CHLORIDE SERPL-SCNC: 104 MMOL/L
CHOLEST SERPL-MCNC: 171 MG/DL
CO2 SERPL-SCNC: 23 MMOL/L
CREAT SERPL-MCNC: 0.8 MG/DL
EGFR: 102 ML/MIN/1.73M2
EOSINOPHIL # BLD AUTO: 0.13 K/UL
EOSINOPHIL NFR BLD AUTO: 2 %
ESTIMATED AVERAGE GLUCOSE: 100 MG/DL
FOLATE SERPL-MCNC: 9.9 NG/ML
GLUCOSE SERPL-MCNC: 88 MG/DL
HBA1C MFR BLD HPLC: 5.1 %
HBV SURFACE AB SER QL: NONREACTIVE
HCT VFR BLD CALC: 40.6 %
HDLC SERPL-MCNC: 69 MG/DL
HGB BLD-MCNC: 13.2 G/DL
IMM GRANULOCYTES NFR BLD AUTO: 0.2 %
LDLC SERPL CALC-MCNC: 87 MG/DL
LYMPHOCYTES # BLD AUTO: 2.3 K/UL
LYMPHOCYTES NFR BLD AUTO: 36.1 %
MAN DIFF?: NORMAL
MCHC RBC-ENTMCNC: 30.7 PG
MCHC RBC-ENTMCNC: 32.5 GM/DL
MCV RBC AUTO: 94.4 FL
MEV IGG FLD QL IA: 7.6 AU/ML
MEV IGG+IGM SER-IMP: NEGATIVE
MONOCYTES # BLD AUTO: 0.6 K/UL
MONOCYTES NFR BLD AUTO: 9.4 %
MUV AB SER-ACNC: POSITIVE
MUV IGG SER QL IA: 150 AU/ML
NEUTROPHILS # BLD AUTO: 3.27 K/UL
NEUTROPHILS NFR BLD AUTO: 51.4 %
NONHDLC SERPL-MCNC: 102 MG/DL
PLATELET # BLD AUTO: 254 K/UL
POTASSIUM SERPL-SCNC: 4.1 MMOL/L
PROT SERPL-MCNC: 7.2 G/DL
RBC # BLD: 4.3 M/UL
RBC # FLD: 13 %
RUBV IGG FLD-ACNC: 5.3 INDEX
RUBV IGG SER-IMP: POSITIVE
SODIUM SERPL-SCNC: 139 MMOL/L
TRIGL SERPL-MCNC: 79 MG/DL
TSH SERPL-ACNC: 4.37 UIU/ML
VIT B12 SERPL-MCNC: 498 PG/ML
VZV AB TITR SER: POSITIVE
VZV IGG SER IF-ACNC: 1060 INDEX
WBC # FLD AUTO: 6.37 K/UL

## 2024-02-09 ENCOUNTER — APPOINTMENT (OUTPATIENT)
Dept: PSYCHIATRY | Facility: CLINIC | Age: 29
End: 2024-02-09
Payer: COMMERCIAL

## 2024-02-09 PROCEDURE — 99214 OFFICE O/P EST MOD 30 MIN: CPT

## 2024-02-09 NOTE — PLAN
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 The differential includes MDD, anxiety, panic attacks, MJ use, binge EtOH use. Plan to start Wellbutrin XL 150mg daily.  On follow up assessments: 1/26/2024: Anxiety 6-7/10, improving energy/motivation and frustration tolerance. Increase Wellbutrin XL to 300mg daily. 2/9/2024: Anxiety slightly improving, 6/10 on worst day at work, ongoing middle insomnia. Discussed option for medication for sleep, pt declined at this time. Continue Wellbutrin XL 300mg daily.  Labs: 10/14/2022: LFTs wnl, vit B12 200 L, TSH 3.49 1/6/2023: CBC wnl, BMP wnl, vit B12 381 6/14/2023: vit B12 413  PLAN -Discussed the diagnosis, treatment, alternatives to recommended treatment, risk Vs benefits of treatment and no treatment and alternative treatments. -Medication: Continue Wellbutrin XL to 300mg daily.  Discussed risks and benefits of Wellbutrin including but not limited to decreased seizure threshold, dizziness, lightheadedness, insomnia, dry mouth, constipation, weight loss, anorexia, hypertension, sweating, rash, GI upset, tremor, tinnitus, induction of prieto. -Discussed recommendation for aerobic exercise -Discussed sleep hygiene -Educated patient of importance of remaining abstinent from drugs and alcohol. -Emergency procedures were discussed: pt. educated to call 911 or go to nearest ER for worsening of symptoms/suicidal/homicidal ideation. -Individual therapy with Mavis Perez, pt to provide consent to contact. -RTC in 4 weeks or earlier as needed -Patient given opportunity to ask questions and their questions were answered and they expressed understanding and agreement with above plan.

## 2024-02-09 NOTE — HISTORY OF PRESENT ILLNESS
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 Goes into office 3 days per week, is home 2 days. Feels calmer during days at home. Getting  in September. Interpersonal conflict with mother about wedding preparation.  Medications: Wellbutrin XL 300mg: taking 300mg. One day when she had a type of coffee that she usually does not have, a cold brew iced coffee from [employer], she felt very anxious the whole day. For the first week since increasing felt a little jittery at work, though it subsequently resolved.  Mood: "ok" Anxiety: "ok", yesterday at work was 6/10 though yesterday was an especially bad day, usually is lower. Panic attacks: denies Motivation: better about going to gym (still 3 times per week) and doing things around the house. At work feels "burnt out" Anhedonia: denies Appetite: "fine" Sleep: asleep 10-11pm, wakes up 5:30am-7am, 5-6 interruptions. Nightmares 4-5 times in past week. Energy: good at home, low at work Focus: low Guilt/worthlessness: denies Thoughts of death: denies Thoughts of harming self: denies Thoughts of harming others: denies  EtOH use: 2 nights, 1-2 glasses per session out to dinner Drug use: 1 MJ use vap pen during interval Tobacco use: denies Caffeine use: 1 cup of coffee per day

## 2024-02-09 NOTE — PHYSICAL EXAM
[None] : none [Cooperative] : cooperative [Euthymic] : euthymic [Anxious] : anxious [Full] : full [Clear] : clear [Linear/Goal Directed] : linear/goal directed [Average] : average [WNL] : within normal limits [FreeTextEntry8] : "ok" [de-identified] : less anxious, stable, ranging to smiling, congruent to content

## 2024-02-09 NOTE — REASON FOR VISIT
[Patient preference] : as per patient preference [Continuing, patient seen in-person within last 12 months] : Telehealth services are continuing as patient has been seen in-person within last 12 months. [Telehealth (audio & video) - Individual/Group] : This visit was provided via telehealth using real-time 2-way audio visual technology. [Medical Office: ([facility])___] : The provider was located at the medical office in [unfilled]. [Home] : The patient, [unfilled], was located at home, [unfilled], at the time of the visit. [Verbal consent obtained from patient/other participant(s)] : Verbal consent for telehealth/telephonic services obtained from patient/other participant(s) [Patient] : Patient [FreeTextEntry4] : 9:30am

## 2024-02-20 NOTE — ED ADULT NURSE NOTE - HISTORY OF COVID-19 VACCINATION
Patient misplaced paper script for  azithromycin (ZITHROMAX) 250 MG tablet  Patient is requesting a script be sent to local pharmacy.  [facility] [address] [place]   Vaccine status unknown

## 2024-03-05 ENCOUNTER — APPOINTMENT (OUTPATIENT)
Dept: FAMILY MEDICINE | Facility: CLINIC | Age: 29
End: 2024-03-05
Payer: COMMERCIAL

## 2024-03-05 VITALS
RESPIRATION RATE: 16 BRPM | HEIGHT: 66 IN | TEMPERATURE: 98 F | WEIGHT: 161 LBS | BODY MASS INDEX: 25.88 KG/M2 | DIASTOLIC BLOOD PRESSURE: 82 MMHG | SYSTOLIC BLOOD PRESSURE: 122 MMHG | HEART RATE: 83 BPM | OXYGEN SATURATION: 99 %

## 2024-03-05 DIAGNOSIS — R94.6 ABNORMAL RESULTS OF THYROID FUNCTION STUDIES: ICD-10-CM

## 2024-03-05 DIAGNOSIS — Z92.29 PERSONAL HISTORY OF OTHER DRUG THERAPY: ICD-10-CM

## 2024-03-05 PROCEDURE — 99214 OFFICE O/P EST MOD 30 MIN: CPT | Mod: 25

## 2024-03-05 PROCEDURE — 90746 HEPB VACCINE 3 DOSE ADULT IM: CPT

## 2024-03-05 PROCEDURE — 36415 COLL VENOUS BLD VENIPUNCTURE: CPT

## 2024-03-05 PROCEDURE — G0010: CPT

## 2024-03-05 NOTE — PLAN
[FreeTextEntry1] : Recheck Thyroid Function-TSH elevated at last visit.   Not taking any Biotin supplements.   Disrupted Sleep-Psych note reviewed; patient did not want to start any meds for sleep at that time.  Discussed sleep medicine evaluation for interrupted sleep. Referral reissued.   Eligible for Hep B series-received 1st vaccine. Advised Ms. JOAO SANDHU they may experience some soreness, tenderness at the injection site.  Advised to call office if  not improving.  Ms. SANDHU expressed understanding.  Will adjust meds based on labs.  Patient expressed understanding of plan.

## 2024-03-05 NOTE — PHYSICAL EXAM
[No Acute Distress] : no acute distress [Well-Appearing] : well-appearing [No Lymphadenopathy] : no lymphadenopathy [Thyroid Normal, No Nodules] : the thyroid was normal and there were no nodules present [Normal] : affect was normal and insight and judgment were intact

## 2024-03-05 NOTE — REVIEW OF SYSTEMS
[Negative] : Gastrointestinal [Chills] : no chills [Fever] : no fever [Nasal Discharge] : no nasal discharge [Chest Pain] : no chest pain [Sore Throat] : no sore throat [Cough] : no cough [Palpitations] : no palpitations [Dysuria] : no dysuria [de-identified] : 6 hours of sleep per night, dreaming a lot; sleep is interrupted-feels like she is seeing things when asleep and wakes up; weight training 6AM.   [FreeTextEntry2] : some tiredness

## 2024-03-05 NOTE — HISTORY OF PRESENT ILLNESS
[FreeTextEntry1] : Here for thyroid recheck [de-identified] : Here for thyroid recheck Taking Vitamin b12.   Still having difficulty sleeping.  Following with Psych. Brings immunization records.  Had 2 MMR.   Eligible to repeat Hep B series.

## 2024-03-06 LAB
T4 FREE SERPL-MCNC: 1.1 NG/DL
TSH SERPL-ACNC: 3.52 UIU/ML

## 2024-03-22 ENCOUNTER — APPOINTMENT (OUTPATIENT)
Dept: PSYCHIATRY | Facility: CLINIC | Age: 29
End: 2024-03-22
Payer: COMMERCIAL

## 2024-03-22 DIAGNOSIS — F10.10 ALCOHOL ABUSE, UNCOMPLICATED: ICD-10-CM

## 2024-03-22 DIAGNOSIS — F41.0 PANIC DISORDER [EPISODIC PAROXYSMAL ANXIETY]: ICD-10-CM

## 2024-03-22 PROCEDURE — 99214 OFFICE O/P EST MOD 30 MIN: CPT | Mod: 95

## 2024-03-22 PROCEDURE — G2211 COMPLEX E/M VISIT ADD ON: CPT | Mod: 95

## 2024-03-22 NOTE — REASON FOR VISIT
[Patient preference] : as per patient preference [Continuing, patient seen in-person within last 12 months] : Telehealth services are continuing as patient has been seen in-person within last 12 months. [Telehealth (audio & video) - Individual/Group] : This visit was provided via telehealth using real-time 2-way audio visual technology. [Medical Office: ([facility])___] : The provider was located at the medical office in [unfilled]. [Home] : The patient, [unfilled], was located at home, [unfilled], at the time of the visit. [Verbal consent obtained from patient/other participant(s)] : Verbal consent for telehealth/telephonic services obtained from patient/other participant(s) [Patient] : Patient [FreeTextEntry4] : 9am

## 2024-03-22 NOTE — PLAN
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 The differential includes MDD, anxiety, panic attacks, MJ use, binge EtOH use. Plan to start Wellbutrin XL 150mg daily.  On follow up assessments: 1/26/2024: Anxiety 6-7/10, improving energy/motivation and frustration tolerance. Increase Wellbutrin XL to 300mg daily. 2/9/2024: Anxiety slightly improving, 6/10 on worst day at work, ongoing middle insomnia. Discussed option for medication for sleep, pt declined at this time. Continue Wellbutrin XL 300mg daily. 3/22/2024: Anxiety peaks at times to 6/10 but baseline is 2-3/10, no panic attacks, ongoing middle insomnia. Continue Wellbutrin XL 300mg daily, discussed option for propranolol prn for anxiety, pt declined at this time but to consider for the future.  Labs: 10/14/2022: LFTs wnl, vit B12 200 L, TSH 3.49 1/6/2023: CBC wnl, BMP wnl, vit B12 381 6/14/2023: vit B12 413  PLAN -Discussed the diagnosis, treatment, alternatives to recommended treatment, risk Vs benefits of treatment and no treatment and alternative treatments. -Medication: Continue Wellbutrin XL to 300mg daily.  Discussed risks and benefits of Wellbutrin including but not limited to decreased seizure threshold, dizziness, lightheadedness, insomnia, dry mouth, constipation, weight loss, anorexia, hypertension, sweating, rash, GI upset, tremor, tinnitus, induction of prieto. -Discussed recommendation for aerobic exercise -Discussed sleep hygiene -Educated patient of importance of remaining abstinent from drugs and alcohol. -Emergency procedures were discussed: pt. educated to call 911 or go to nearest ER for worsening of symptoms/suicidal/homicidal ideation. -Individual therapy with Mavis Perez [phone] -RTC in 4 weeks or earlier as needed -Patient given opportunity to ask questions and their questions were answered and they expressed understanding and agreement with above plan.

## 2024-03-22 NOTE — HISTORY OF PRESENT ILLNESS
[FreeTextEntry1] : Social: worked out, looking forward to the weekend. Wedding planning has been stressful. Interpersonal conflict with mother.   Medications: Wellbutrin XL 300mg: sometimes side effects of heart racing. Takes around 7:15am-7:30am.  Denies hx of asthma. Denies hx hypotension, low HR.  Mood: "good" Anxiety: sometimes has spurts of anxiety, has not happened in the past week, but happened a couple of weeks after increasing Wellbutrin to 300mg. Gets to 6/10 at these times, is able to keep doing what she is doing but feels uncomfortable. Average 2-3/10. Panic attacks: denies Motivation: good Anhedonia: denies Appetite: "fine" Sleep: asleep 10-11pm, wakes up 5:30am-7am, 5-6 interruptions but falls back asleep easily. Denies nightmares since got eye mask two weeks ago, prior to that was nightly. Energy: sometimes good sometimes low Focus: "alright" Guilt/worthlessness: endorses in context of interpersonal conflict with mother Thoughts of death: denies Thoughts of harming self: denies Thoughts of harming others: denies  EtOH use:  1-2 glasses per session out to dinner, once per week Drug use: denies Tobacco use: none elicited Caffeine use: has coffee at 10-11am, finds this helps reduce heart rate instead of taking it with Wellbutrin.

## 2024-03-22 NOTE — PHYSICAL EXAM
[None] : none [Cooperative] : cooperative [Euthymic] : euthymic [Full] : full [Clear] : clear [Linear/Goal Directed] : linear/goal directed [Average] : average [WNL] : within normal limits [de-identified] : less anxious, stable, ranging to smiling, congruent to content

## 2024-04-18 ENCOUNTER — APPOINTMENT (OUTPATIENT)
Dept: FAMILY MEDICINE | Facility: CLINIC | Age: 29
End: 2024-04-18
Payer: COMMERCIAL

## 2024-04-18 VITALS
OXYGEN SATURATION: 98 % | SYSTOLIC BLOOD PRESSURE: 125 MMHG | BODY MASS INDEX: 26.2 KG/M2 | DIASTOLIC BLOOD PRESSURE: 89 MMHG | RESPIRATION RATE: 16 BRPM | WEIGHT: 163 LBS | TEMPERATURE: 98.7 F | HEIGHT: 66 IN | HEART RATE: 87 BPM

## 2024-04-18 DIAGNOSIS — Z23 ENCOUNTER FOR IMMUNIZATION: ICD-10-CM

## 2024-04-18 DIAGNOSIS — G47.9 SLEEP DISORDER, UNSPECIFIED: ICD-10-CM

## 2024-04-18 PROCEDURE — 99213 OFFICE O/P EST LOW 20 MIN: CPT | Mod: 25

## 2024-04-18 PROCEDURE — G0010: CPT

## 2024-04-18 PROCEDURE — 90739 HEPB VACC 2/4 DOSE ADULT IM: CPT

## 2024-04-18 NOTE — REVIEW OF SYSTEMS
[Fever] : no fever [Chills] : no chills [Negative] : Gastrointestinal [de-identified] : Sleeping better 7 hours now; using a eye mask.  Using for 1 month.

## 2024-04-26 ENCOUNTER — APPOINTMENT (OUTPATIENT)
Dept: PSYCHIATRY | Facility: CLINIC | Age: 29
End: 2024-04-26
Payer: COMMERCIAL

## 2024-04-26 DIAGNOSIS — F32.9 MAJOR DEPRESSIVE DISORDER, SINGLE EPISODE, UNSPECIFIED: ICD-10-CM

## 2024-04-26 DIAGNOSIS — F12.90 CANNABIS USE, UNSPECIFIED, UNCOMPLICATED: ICD-10-CM

## 2024-04-26 DIAGNOSIS — F41.9 ANXIETY DISORDER, UNSPECIFIED: ICD-10-CM

## 2024-04-26 PROCEDURE — G2211 COMPLEX E/M VISIT ADD ON: CPT | Mod: 95

## 2024-04-26 PROCEDURE — 99214 OFFICE O/P EST MOD 30 MIN: CPT | Mod: 95

## 2024-04-26 RX ORDER — BUPROPION HYDROCHLORIDE 300 MG/1
300 TABLET, EXTENDED RELEASE ORAL
Qty: 90 | Refills: 0 | Status: ACTIVE | COMMUNITY
Start: 2024-01-12 | End: 1900-01-01

## 2024-04-26 NOTE — REASON FOR VISIT
[Patient preference] : as per patient preference [Continuing, patient seen in-person within last 12 months] : Telehealth services are continuing as patient has been seen in-person within last 12 months. [Telehealth (audio & video) - Individual/Group] : This visit was provided via telehealth using real-time 2-way audio visual technology. [Medical Office: ([facility])___] : The provider was located at the medical office in [unfilled]. [Home] : The patient, [unfilled], was located at home, [unfilled], at the time of the visit. [Verbal consent obtained from patient/other participant(s)] : Verbal consent for telehealth/telephonic services obtained from patient/other participant(s) [Patient] : Patient [FreeTextEntry4] : 1:30pm

## 2024-04-26 NOTE — PHYSICAL EXAM
[None] : none [Cooperative] : cooperative [Euthymic] : euthymic [Full] : full [Clear] : clear [Linear/Goal Directed] : linear/goal directed [Average] : average [WNL] : within normal limits [de-identified] : less anxious, stable, ranging to smiling, congruent to content

## 2024-04-26 NOTE — PLAN
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 The differential includes MDD, anxiety, panic attacks, MJ use, binge EtOH use. Plan to start Wellbutrin XL 150mg daily.  On follow up assessments: 1/26/2024: Anxiety 6-7/10, improving energy/motivation and frustration tolerance. Increase Wellbutrin XL to 300mg daily. 2/9/2024: Anxiety slightly improving, 6/10 on worst day at work, ongoing middle insomnia. Discussed option for medication for sleep, pt declined at this time. Continue Wellbutrin XL 300mg daily. 3/22/2024: Anxiety peaks at times to 6/10 but baseline is 2-3/10, no panic attacks, ongoing middle insomnia. Continue Wellbutrin XL 300mg daily, discussed option for propranolol prn for anxiety, pt declined at this time but to consider for the future. 4/26/2024: Ongoing anxiety, improving depressive symptoms. Discussed option for SSRI such as starting Lexapro for anxiety vs propranolol as needed for anxiety, pt declined at this time, to consider at the next appt. Continuing Wellbutrin XL 300mg daily. Pt is considering changing therapists.  Labs: 10/14/2022: LFTs wnl, vit B12 200 L, TSH 3.49 1/6/2023: CBC wnl, BMP wnl, vit B12 381 6/14/2023: vit B12 413  PLAN -Discussed the diagnosis, treatment, alternatives to recommended treatment, risk Vs benefits of treatment and no treatment and alternative treatments. -Medication: Continue Wellbutrin XL 300mg daily.  Discussed risks and benefits of Wellbutrin including but not limited to decreased seizure threshold, dizziness, lightheadedness, insomnia, dry mouth, constipation, weight loss, anorexia, hypertension, sweating, rash, GI upset, tremor, tinnitus, induction of prieto. -Discussed recommendation for aerobic exercise -Discussed sleep hygiene -Educated patient of importance of remaining abstinent from drugs and alcohol. -Emergency procedures were discussed: pt. educated to call 911 or go to nearest ER for worsening of symptoms/suicidal/homicidal ideation. -Individual therapy with Mavis Perez [phone] -[facility] in 4 weeks or earlier as needed -Patient given opportunity to ask questions and their questions were answered and they expressed understanding and agreement with above plan.

## 2024-04-26 NOTE — HISTORY OF PRESENT ILLNESS
[FreeTextEntry1] : Social: Goes to 6am exercise class three times a week, has been doing for 2 years. Recently started walking every day with her dog 1.5 miles. Interpersonal conflict with mother with intermittent big arguments and intermittent periods of quiet.  Medications: Wellbutrin XL 300mg: endorses adherence, no side effects elicited Discussed option for SSRI such as Lexapro, and option for propranolol as needed for anxiety.  Mood: feels less depressed Anxiety: a little more anxiety "here and there". Sometimes feels irritable in anxiety provoking situations. Catastrophizes. Panic attacks: denies Motivation: improved Anhedonia: denies Appetite: fine Sleep: last night slept through the night, though usually has interruptions. Last night woke up once or twice.  Energy: up and down  Focus: fair Guilt/worthlessness: endorses feelings of guilt in context of interpersonal conflict with mother.  Thoughts of death: none elicited Thoughts of harming self: none elicited Thoughts of harming others: none elicited  EtOH use:  1-2 glasses per session out to dinner, once per week Drug use: denies Tobacco use: denies Caffeine use: 1 cup of coffee in AM at 10-11am

## 2024-07-18 ENCOUNTER — APPOINTMENT (OUTPATIENT)
Dept: INFECTIOUS DISEASE | Facility: CLINIC | Age: 29
End: 2024-07-18
Payer: SELF-PAY

## 2024-07-18 DIAGNOSIS — Z71.84 ENC FOR HEALTH COUNSELING RELATED TO TRAVEL: ICD-10-CM

## 2024-07-18 PROCEDURE — 99401 PREV MED CNSL INDIV APPRX 15: CPT | Mod: 25

## 2024-07-18 PROCEDURE — 90471 IMMUNIZATION ADMIN: CPT

## 2024-07-18 PROCEDURE — 90690 TYPHOID VACCINE ORAL: CPT

## 2024-07-18 RX ORDER — ATOVAQUONE AND PROGUANIL HYDROCHLORIDE 250; 100 MG/1; MG/1
250-100 TABLET, FILM COATED ORAL DAILY
Qty: 14 | Refills: 0 | Status: ACTIVE | COMMUNITY
Start: 2024-07-18 | End: 1900-01-01

## 2024-07-18 RX ORDER — AZITHROMYCIN 250 MG/1
250 TABLET, FILM COATED ORAL
Qty: 4 | Refills: 0 | Status: ACTIVE | COMMUNITY
Start: 2024-07-18 | End: 1900-01-01

## 2024-07-23 ENCOUNTER — APPOINTMENT (OUTPATIENT)
Dept: PSYCHIATRY | Facility: CLINIC | Age: 29
End: 2024-07-23
Payer: COMMERCIAL

## 2024-07-23 DIAGNOSIS — F41.0 PANIC DISORDER [EPISODIC PAROXYSMAL ANXIETY]: ICD-10-CM

## 2024-07-23 DIAGNOSIS — F41.9 ANXIETY DISORDER, UNSPECIFIED: ICD-10-CM

## 2024-07-23 DIAGNOSIS — F32.9 MAJOR DEPRESSIVE DISORDER, SINGLE EPISODE, UNSPECIFIED: ICD-10-CM

## 2024-07-23 PROCEDURE — 99214 OFFICE O/P EST MOD 30 MIN: CPT | Mod: 95

## 2024-07-23 PROCEDURE — G2211 COMPLEX E/M VISIT ADD ON: CPT | Mod: 95

## 2024-07-23 RX ORDER — ESCITALOPRAM OXALATE 5 MG/1
5 TABLET ORAL
Qty: 30 | Refills: 0 | Status: ACTIVE | COMMUNITY
Start: 2024-07-23 | End: 1900-01-01

## 2024-07-23 NOTE — HISTORY OF PRESENT ILLNESS
[FreeTextEntry1] : Social: Pt's fiance's grandfather passed away during the interval. Had a bridal shower, felt stressed by the planning for it. Munday so anxious that she had trouble getting out of the car to engage with the day. Therapy:  Medications: Wellbutrin XL 300mg: Pt stopped taking in May since pt felt very anxious, felt it was making her more jittery.  Option for SSRI such as Lexapro: plan to start Lexapro 5mg daily at bedtime.  Mood: "relieved but also a little emotional." Anxiety: endorses significant anxiety about wedding planning, irritability. Panic attacks: denies Motivation: up and down Anhedonia: denies Appetite: fine Sleep: Fals asleep around 10:30-10:45pm, multiple interruptions that disturb sleep quality. Awake for good at 5:30am if working out or 7am. +middle insomnia   Energy: "up and down" Focus: impaired Guilt/worthlessness: ongoing feelings of guilt Thoughts of death: denies Thoughts of harming self: denies Thoughts of harming others: denies  EtOH use: 1-2 drinks socially  Drug use: denies Tobacco use: denies  Caffeine use: 1 cup of coffee in the AM

## 2024-07-23 NOTE — PLAN
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 The differential includes MDD, anxiety, panic attacks, MJ use, binge EtOH use. Plan to start Wellbutrin XL 150mg daily.  On follow up assessments: 1/26/2024: Anxiety 6-7/10, improving energy/motivation and frustration tolerance. Increase Wellbutrin XL to 300mg daily. 2/9/2024: Anxiety slightly improving, 6/10 on worst day at work, ongoing middle insomnia. Discussed option for medication for sleep, pt declined at this time. Continue Wellbutrin XL 300mg daily. 3/22/2024: Anxiety peaks at times to 6/10 but baseline is 2-3/10, no panic attacks, ongoing middle insomnia. Continue Wellbutrin XL 300mg daily, discussed option for propranolol prn for anxiety, pt declined at this time but to consider for the future. 4/26/2024: Ongoing anxiety, improving depressive symptoms. Discussed option for SSRI such as starting Lexapro for anxiety vs propranolol as needed for anxiety, pt declined at this time, to consider at the next appt. Continuing Wellbutrin XL 300mg daily. Pt is considering changing therapists. 7/23/2024: Ongoing anxiety in the context of wedding planning, stable depressive symptoms. stopped Wellbutrin in May due to anxiety. Start Lexapro 5mg daily.  Labs: 10/14/2022: LFTs wnl, vit B12 200 L, TSH 3.49 1/6/2023: CBC wnl, BMP wnl, vit B12 381 6/14/2023: vit B12 413  PLAN -Discussed the diagnosis, treatment, alternatives to recommended treatment, risk Vs benefits of treatment and no treatment and alternative treatments. -Medication: Start Lexapro 5mg daily. Stopped Wellbutrin due to anxiety.   SSRI side effects including but not limited to GI side effects, headaches, dizziness, serotonin syndrome, hyponatremia, QT prolongation, weight gain, decreased libido, and black box warning of SI in patients younger than 24 were discussed. -Discussed recommendation for aerobic exercise -Discussed sleep hygiene -Educated patient of importance of remaining abstinent from drugs and alcohol. -Emergency procedures were discussed: pt. educated to call 911 or go to nearest ER for worsening of symptoms/suicidal/homicidal ideation. -Individual therapy with Mavis Perez [phone] -RT in 4 weeks or earlier as needed -Patient given opportunity to ask questions and their questions were answered and they expressed understanding and agreement with above plan.

## 2024-07-23 NOTE — PLAN
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 The differential includes MDD, anxiety, panic attacks, MJ use, binge EtOH use. Plan to start Wellbutrin XL 150mg daily.  On follow up assessments: 1/26/2024: Anxiety 6-7/10, improving energy/motivation and frustration tolerance. Increase Wellbutrin XL to 300mg daily. 2/9/2024: Anxiety slightly improving, 6/10 on worst day at work, ongoing middle insomnia. Discussed option for medication for sleep, pt declined at this time. Continue Wellbutrin XL 300mg daily. 3/22/2024: Anxiety peaks at times to 6/10 but baseline is 2-3/10, no panic attacks, ongoing middle insomnia. Continue Wellbutrin XL 300mg daily, discussed option for propranolol prn for anxiety, pt declined at this time but to consider for the future. 4/26/2024: Ongoing anxiety, improving depressive symptoms. Discussed option for SSRI such as starting Lexapro for anxiety vs propranolol as needed for anxiety, pt declined at this time, to consider at the next appt. Continuing Wellbutrin XL 300mg daily. Pt is considering changing therapists. 7/23/2024: Ongoing anxiety in the context of wedding planning, stable depressive symptoms. stopped Wellbutrin in May due to anxiety. Start Lexapro 5mg daily.  Labs: 10/14/2022: LFTs wnl, vit B12 200 L, TSH 3.49 1/6/2023: CBC wnl, BMP wnl, vit B12 381 6/14/2023: vit B12 413  PLAN -Discussed the diagnosis, treatment, alternatives to recommended treatment, risk Vs benefits of treatment and no treatment and alternative treatments. -Medication: Start Lexapro 5mg daily. Stopped Wellbutrin due to anxiety.   SSRI side effects including but not limited to GI side effects, headaches, dizziness, serotonin syndrome, hyponatremia, QT prolongation, weight gain, decreased libido, and black box warning of SI in patients younger than 24 were discussed. -Discussed recommendation for aerobic exercise -Discussed sleep hygiene -Educated patient of importance of remaining abstinent from drugs and alcohol. -Emergency procedures were discussed: pt. educated to call 911 or go to nearest ER for worsening of symptoms/suicidal/homicidal ideation. -Individual therapy with Mavis Perez [phone] -RT in 4 weeks or earlier as needed -Patient given opportunity to ask questions and their questions were answered and they expressed understanding and agreement with above plan.

## 2024-07-23 NOTE — REASON FOR VISIT
[Patient preference] : as per patient preference [Continuing, patient seen in-person within last 12 months] : Telehealth services are continuing as patient has been seen in-person within last 12 months. [Telehealth (audio & video) - Individual/Group] : This visit was provided via telehealth using real-time 2-way audio visual technology. [Medical Office: ([facility])___] : The provider was located at the medical office in [unfilled]. [Home] : The patient, [unfilled], was located at home, [unfilled], at the time of the visit. [Verbal consent obtained from patient/other participant(s)] : Verbal consent for telehealth/telephonic services obtained from patient/other participant(s) [Patient] : Patient [FreeTextEntry4] : 10:30am

## 2024-07-23 NOTE — PHYSICAL EXAM
[None] : none [Cooperative] : cooperative [Anxious] : anxious [Full] : full [Clear] : clear [Linear/Goal Directed] : linear/goal directed [Average] : average [WNL] : within normal limits [de-identified] : anxious, ranging to smiling, congruent to content [FreeTextEntry7] : denies suicidal ideation/intent/plan or non-suicidal self-injurious ideation/intent/plan or homicidal ideation/intent/plan

## 2024-07-23 NOTE — REASON FOR VISIT
[Patient preference] : as per patient preference [Continuing, patient seen in-person within last 12 months] : Telehealth services are continuing as patient has been seen in-person within last 12 months. [Telehealth (audio & video) - Individual/Group] : This visit was provided via telehealth using real-time 2-way audio visual technology. [Medical Office: ([facility])___] : The provider was located at the medical office in [unfilled]. [Home] : The patient, [unfilled], was located at home, [unfilled], at the time of the visit. [Verbal consent obtained from patient/other participant(s)] : Verbal consent for telehealth/telephonic services obtained from patient/other participant(s) [Patient] : Patient [FreeTextEntry4] : 10:30am

## 2024-07-23 NOTE — PHYSICAL EXAM
[None] : none [Cooperative] : cooperative [Anxious] : anxious [Full] : full [Clear] : clear [Linear/Goal Directed] : linear/goal directed [Average] : average [WNL] : within normal limits [de-identified] : anxious, ranging to smiling, congruent to content [FreeTextEntry7] : denies suicidal ideation/intent/plan or non-suicidal self-injurious ideation/intent/plan or homicidal ideation/intent/plan

## 2024-07-23 NOTE — HISTORY OF PRESENT ILLNESS
[FreeTextEntry1] : Social: Pt's fiance's grandfather passed away during the interval. Had a bridal shower, felt stressed by the planning for it. [place] so anxious that she had trouble getting out of the car to engage with the day. Therapy:  Medications: Wellbutrin XL 300mg: Pt stopped taking in May since pt felt very anxious, felt it was making her more jittery.  Option for SSRI such as Lexapro: plan to start Lexapro 5mg daily at bedtime.  Mood: "relieved but also a little emotional." Anxiety: endorses significant anxiety about wedding planning, irritability. Panic attacks: denies Motivation: up and down Anhedonia: denies Appetite: fine Sleep: Fals asleep around 10:30-10:45pm, multiple interruptions that disturb sleep quality. Awake for good at 5:30am if working out or 7am. +middle insomnia   Energy: "up and down" Focus: impaired Guilt/worthlessness: ongoing feelings of guilt Thoughts of death: denies Thoughts of harming self: denies Thoughts of harming others: denies  EtOH use: 1-2 drinks socially  Drug use: denies Tobacco use: denies  Caffeine use: 1 cup of coffee in the AM

## 2024-08-14 ENCOUNTER — APPOINTMENT (OUTPATIENT)
Dept: FAMILY MEDICINE | Facility: CLINIC | Age: 29
End: 2024-08-14
Payer: COMMERCIAL

## 2024-08-14 VITALS
WEIGHT: 160 LBS | DIASTOLIC BLOOD PRESSURE: 80 MMHG | HEIGHT: 66 IN | TEMPERATURE: 97.5 F | SYSTOLIC BLOOD PRESSURE: 117 MMHG | RESPIRATION RATE: 16 BRPM | BODY MASS INDEX: 25.71 KG/M2 | HEART RATE: 72 BPM | OXYGEN SATURATION: 98 %

## 2024-08-14 DIAGNOSIS — Z23 ENCOUNTER FOR IMMUNIZATION: ICD-10-CM

## 2024-08-14 DIAGNOSIS — K12.0 RECURRENT ORAL APHTHAE: ICD-10-CM

## 2024-08-14 DIAGNOSIS — K13.6 IRRITATIVE HYPERPLASIA OF ORAL MUCOSA: ICD-10-CM

## 2024-08-14 PROCEDURE — 99213 OFFICE O/P EST LOW 20 MIN: CPT | Mod: 25

## 2024-08-14 PROCEDURE — 90739 HEPB VACC 2/4 DOSE ADULT IM: CPT

## 2024-08-14 PROCEDURE — G0010: CPT

## 2024-08-14 PROCEDURE — 90471 IMMUNIZATION ADMIN: CPT

## 2024-08-14 PROCEDURE — 90715 TDAP VACCINE 7 YRS/> IM: CPT

## 2024-08-14 NOTE — PHYSICAL EXAM
[No Acute Distress] : no acute distress [Normal Oropharynx] : the oropharynx was normal [No Extremity Clubbing/Cyanosis] : no extremity clubbing/cyanosis [Normal] : affect was normal and insight and judgment were intact [de-identified] : canker sore left lateral tongue

## 2024-08-14 NOTE — REVIEW OF SYSTEMS
[Negative] : Gastrointestinal [Fever] : no fever [Chills] : no chills [Chest Pain] : no chest pain [Palpitations] : no palpitations [Dysuria] : no dysuria [Headache] : no headache [Dizziness] : no dizziness [FreeTextEntry8] : LMP-2 weeks ago

## 2024-08-14 NOTE — PLAN
[FreeTextEntry1] : Received 3rd Hep B right Arm. Received Tdap left arm. Advised Ms. JOAO SANDHU they may experience some soreness, tenderness at the injection site.  Advised to call office if  not improving.  Ms. SANDHU expressed understanding.  Canker Sore-advised salt water gargles; avoid spicy and acidic foods. Discussed ENT eval if recurrent episodes-denies any reflux or heartburn symptoms.   Patient expressed understanding of plan.

## 2024-08-14 NOTE — HISTORY OF PRESENT ILLNESS
[FreeTextEntry1] : Here for follow-up, needs 3rd Hep B [de-identified] : Here for follow-up, needs 3rd Hep B  Went to travel clinic-given meds for traveller's diarrhea and malaria. Unclear when last Tdap was given.   Also has bad canker sore on left side of mouth-has been rinsing with salt water. Notes she seems to be getting them more often-has been stressed past few weeks.   Upcoming wedding in September-going to [place] for Honeymoon.

## 2024-08-16 ENCOUNTER — APPOINTMENT (OUTPATIENT)
Dept: OTOLARYNGOLOGY | Facility: CLINIC | Age: 29
End: 2024-08-16

## 2024-08-20 ENCOUNTER — APPOINTMENT (OUTPATIENT)
Dept: PSYCHIATRY | Facility: CLINIC | Age: 29
End: 2024-08-20
Payer: COMMERCIAL

## 2024-08-20 DIAGNOSIS — F12.90 CANNABIS USE, UNSPECIFIED, UNCOMPLICATED: ICD-10-CM

## 2024-08-20 DIAGNOSIS — F32.9 MAJOR DEPRESSIVE DISORDER, SINGLE EPISODE, UNSPECIFIED: ICD-10-CM

## 2024-08-20 DIAGNOSIS — F41.0 PANIC DISORDER [EPISODIC PAROXYSMAL ANXIETY]: ICD-10-CM

## 2024-08-20 DIAGNOSIS — F10.10 ALCOHOL ABUSE, UNCOMPLICATED: ICD-10-CM

## 2024-08-20 DIAGNOSIS — F41.9 ANXIETY DISORDER, UNSPECIFIED: ICD-10-CM

## 2024-08-20 PROCEDURE — G2211 COMPLEX E/M VISIT ADD ON: CPT | Mod: 95

## 2024-08-20 PROCEDURE — 99214 OFFICE O/P EST MOD 30 MIN: CPT | Mod: 95

## 2024-08-20 NOTE — PHYSICAL EXAM
[None] : none [Cooperative] : cooperative [Full] : full [Clear] : clear [Linear/Goal Directed] : linear/goal directed [Average] : average [WNL] : within normal limits [Euthymic] : euthymic [FreeTextEntry8] : "pretty mellow" [de-identified] : less anxious, ranging to smiling, congruent to content [FreeTextEntry7] : denies suicidal ideation/intent/plan or non-suicidal self-injurious ideation/intent/plan or homicidal ideation/intent/plan

## 2024-08-20 NOTE — PLAN
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 The differential includes MDD, anxiety, panic attacks, MJ use, binge EtOH use. Plan to start Wellbutrin XL 150mg daily.  On follow up assessments: 1/26/2024: Anxiety 6-7/10, improving energy/motivation and frustration tolerance. Increase Wellbutrin XL to 300mg daily. 2/9/2024: Anxiety slightly improving, 6/10 on worst day at work, ongoing middle insomnia. Discussed option for medication for sleep, pt declined at this time. Continue Wellbutrin XL 300mg daily. 3/22/2024: Anxiety peaks at times to 6/10 but baseline is 2-3/10, no panic attacks, ongoing middle insomnia. Continue Wellbutrin XL 300mg daily, discussed option for propranolol prn for anxiety, pt declined at this time but to consider for the future. 4/26/2024: Ongoing anxiety, improving depressive symptoms. Discussed option for SSRI such as starting Lexapro for anxiety vs propranolol as needed for anxiety, pt declined at this time, to consider at the next appt. Continuing Wellbutrin XL 300mg daily. Pt is considering changing therapists. 7/23/2024: Ongoing anxiety in the context of wedding planning, stable depressive symptoms. stopped Wellbutrin in May due to anxiety. Start Lexapro 5mg daily. 8/20/2024: Improved anxiety 4/10 average on Lexapro 5mg, plan to continue this dose per pt's preference. Pt is getting  Sept 21, 2024, and feels better able to tolerate the stress of the process.  Labs: 10/14/2022: LFTs wnl, vit B12 200 L, TSH 3.49 1/6/2023: CBC wnl, BMP wnl, vit B12 381 6/14/2023: vit B12 413  PLAN -Discussed the diagnosis, treatment, alternatives to recommended treatment, risk Vs benefits of treatment and no treatment and alternative treatments. -Medication: Continue Lexapro 5mg daily. Stopped Wellbutrin due to anxiety.  SSRI side effects including but not limited to GI side effects, headaches, dizziness, serotonin syndrome, hyponatremia, QT prolongation, weight gain, decreased libido, and black box warning of SI in patients younger than 24 were discussed. -Discussed recommendation for aerobic exercise -Discussed sleep hygiene -Educated patient of importance of remaining abstinent from drugs and alcohol. -Emergency procedures were discussed: pt. educated to call 911 or go to nearest ER for worsening of symptoms/suicidal/homicidal ideation. -Individual therapy with Mavis Perez [phone] -RT in 8-12 weeks or earlier as needed -Patient given opportunity to ask questions and their questions were answered and they expressed understanding and agreement with above plan.

## 2024-08-20 NOTE — REASON FOR VISIT
[Patient preference] : as per patient preference [Continuing, patient seen in-person within last 12 months] : Telehealth services are continuing as patient has been seen in-person within last 12 months. [Telehealth (audio & video) - Individual/Group] : This visit was provided via telehealth using real-time 2-way audio visual technology. [Medical Office: ([facility])___] : The provider was located at the medical office in [unfilled]. [Verbal consent obtained from patient/other participant(s)] : Verbal consent for telehealth/telephonic services obtained from patient/other participant(s) [Patient] : Patient [Other Location: e.g. Home (Enter Location, City,State)___] : The patient, [unfilled], was located at [unfilled] at the time of the visit. [FreeTextEntry4] : 10:30am

## 2024-08-20 NOTE — HISTORY OF PRESENT ILLNESS
[FreeTextEntry1] : Social: Upcoming wedding Sept 21, 2024. Feels better able to tolerate interpersonal conflict.  Medications: Wellbutrin XL 300mg: not taking since May. Lexapro 5mg: feels that it is going well, feels "less reactive". Denies current side effects.  Mood: "pretty mellow" Anxiety: 4/10 currently Panic attacks: denies Motivation: good Anhedonia: denies Appetite: unchanged Sleep: Has had vivid dreams, multiple interruptions. Asleep 10:30-10:45pm, waking up 5:30am if working out or 7am. Energy: up and down Focus: "not great" due to stress Guilt/worthlessness: improving feelings of guilt Thoughts of death: none elicited Thoughts of harming self: none elicited Thoughts of harming others: none elicited  EtOH use: rarely, socially Drug use: denies Tobacco use: denies Caffeine use: 1 cup of coffee per day

## 2024-08-20 NOTE — PLAN
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 The differential includes MDD, anxiety, panic attacks, MJ use, binge EtOH use. Plan to start Wellbutrin XL 150mg daily.  On follow up assessments: 1/26/2024: Anxiety 6-7/10, improving energy/motivation and frustration tolerance. Increase Wellbutrin XL to 300mg daily. 2/9/2024: Anxiety slightly improving, 6/10 on worst day at work, ongoing middle insomnia. Discussed option for medication for sleep, pt declined at this time. Continue Wellbutrin XL 300mg daily. 3/22/2024: Anxiety peaks at times to 6/10 but baseline is 2-3/10, no panic attacks, ongoing middle insomnia. Continue Wellbutrin XL 300mg daily, discussed option for propranolol prn for anxiety, pt declined at this time but to consider for the future. 4/26/2024: Ongoing anxiety, improving depressive symptoms. Discussed option for SSRI such as starting Lexapro for anxiety vs propranolol as needed for anxiety, pt declined at this time, to consider at the next appt. Continuing Wellbutrin XL 300mg daily. Pt is considering changing therapists. 7/23/2024: Ongoing anxiety in the context of wedding planning, stable depressive symptoms. stopped Wellbutrin in May due to anxiety. Start Lexapro 5mg daily. 8/20/2024: Improved anxiety 4/10 average on Lexapro 5mg, plan to continue this dose per pt's preference. Pt is getting  Sept 21, 2024, and feels better able to tolerate the stress of the process.  Labs: 10/14/2022: LFTs wnl, vit B12 200 L, TSH 3.49 1/6/2023: CBC wnl, BMP wnl, vit B12 381 6/14/2023: vit B12 413  PLAN -Discussed the diagnosis, treatment, alternatives to recommended treatment, risk Vs benefits of treatment and no treatment and alternative treatments. -Medication: Continue Lexapro 5mg daily. Stopped Wellbutrin due to anxiety.  SSRI side effects including but not limited to GI side effects, headaches, dizziness, serotonin syndrome, hyponatremia, QT prolongation, weight gain, decreased libido, and black box warning of SI in patients younger than 24 were discussed. -Discussed recommendation for aerobic exercise -Discussed sleep hygiene -Educated patient of importance of remaining abstinent from drugs and alcohol. -Emergency procedures were discussed: pt. educated to call 911 or go to nearest ER for worsening of symptoms/suicidal/homicidal ideation. -Individual therapy with Mavis Perez [phone] -RT in 8-12 weeks or earlier as needed -Patient given opportunity to ask questions and their questions were answered and they expressed understanding and agreement with above plan.

## 2024-08-20 NOTE — REASON FOR VISIT
[Patient preference] : as per patient preference [Continuing, patient seen in-person within last 12 months] : Telehealth services are continuing as patient has been seen in-person within last 12 months. [Telehealth (audio & video) - Individual/Group] : This visit was provided via telehealth using real-time 2-way audio visual technology. [Medical Office: ([facility])___] : The provider was located at the medical office in [unfilled]. [Verbal consent obtained from patient/other participant(s)] : Verbal consent for telehealth/telephonic services obtained from patient/other participant(s) [Patient] : Patient [Other Location: e.g. Home (Enter Location, City,State)___] : The patient, [unfilled], was located at [unfilled] at the time of the visit. [FreeTextEntry4] : 10:30am

## 2024-08-20 NOTE — PHYSICAL EXAM
[None] : none [Cooperative] : cooperative [Full] : full [Clear] : clear [Linear/Goal Directed] : linear/goal directed [Average] : average [WNL] : within normal limits [Euthymic] : euthymic [FreeTextEntry8] : "pretty mellow" [de-identified] : less anxious, ranging to smiling, congruent to content [FreeTextEntry7] : denies suicidal ideation/intent/plan or non-suicidal self-injurious ideation/intent/plan or homicidal ideation/intent/plan

## 2024-08-27 ENCOUNTER — NON-APPOINTMENT (OUTPATIENT)
Age: 29
End: 2024-08-27

## 2024-08-27 NOTE — DISCUSSION/SUMMARY
[FreeTextEntry1] : Writer called pt to let know that writer resent prescription for 1 tab daily Lexapro 5mg. No acute issues elicited at this time.

## 2024-11-15 ENCOUNTER — APPOINTMENT (OUTPATIENT)
Dept: PSYCHIATRY | Facility: CLINIC | Age: 29
End: 2024-11-15
Payer: COMMERCIAL

## 2024-11-15 DIAGNOSIS — F41.9 ANXIETY DISORDER, UNSPECIFIED: ICD-10-CM

## 2024-11-15 DIAGNOSIS — F32.9 MAJOR DEPRESSIVE DISORDER, SINGLE EPISODE, UNSPECIFIED: ICD-10-CM

## 2024-11-15 DIAGNOSIS — F41.0 PANIC DISORDER [EPISODIC PAROXYSMAL ANXIETY]: ICD-10-CM

## 2024-11-15 DIAGNOSIS — F10.10 ALCOHOL ABUSE, UNCOMPLICATED: ICD-10-CM

## 2024-11-15 PROCEDURE — 99214 OFFICE O/P EST MOD 30 MIN: CPT | Mod: 95

## 2024-11-15 PROCEDURE — G2211 COMPLEX E/M VISIT ADD ON: CPT | Mod: 95

## 2025-02-10 ENCOUNTER — NON-APPOINTMENT (OUTPATIENT)
Age: 30
End: 2025-02-10

## 2025-02-26 ENCOUNTER — NON-APPOINTMENT (OUTPATIENT)
Age: 30
End: 2025-02-26